# Patient Record
Sex: MALE | Race: WHITE | NOT HISPANIC OR LATINO | Employment: OTHER | URBAN - NONMETROPOLITAN AREA
[De-identification: names, ages, dates, MRNs, and addresses within clinical notes are randomized per-mention and may not be internally consistent; named-entity substitution may affect disease eponyms.]

---

## 2021-01-25 ENCOUNTER — IMMUNIZATION (OUTPATIENT)
Dept: VACCINE CLINIC | Facility: HOSPITAL | Age: 71
End: 2021-01-25

## 2021-01-25 PROCEDURE — 91300 HC SARSCOV02 VAC 30MCG/0.3ML IM: CPT | Performed by: FAMILY MEDICINE

## 2021-01-25 PROCEDURE — 0001A: CPT | Performed by: FAMILY MEDICINE

## 2021-02-16 ENCOUNTER — IMMUNIZATION (OUTPATIENT)
Dept: VACCINE CLINIC | Facility: HOSPITAL | Age: 71
End: 2021-02-16

## 2021-02-16 PROCEDURE — 0002A: CPT | Performed by: INTERNAL MEDICINE

## 2021-02-16 PROCEDURE — 91300 HC SARSCOV02 VAC 30MCG/0.3ML IM: CPT | Performed by: INTERNAL MEDICINE

## 2021-04-27 ENCOUNTER — HOSPITAL ENCOUNTER (OUTPATIENT)
Dept: GENERAL RADIOLOGY | Facility: HOSPITAL | Age: 71
Discharge: HOME | End: 2021-04-27
Admitting: INTERNAL MEDICINE

## 2021-04-27 ENCOUNTER — TRANSCRIBE ORDERS (OUTPATIENT)
Dept: ADMINISTRATIVE | Facility: HOSPITAL | Age: 71
End: 2021-04-27

## 2021-04-27 DIAGNOSIS — M25.511 RIGHT SHOULDER PAIN, UNSPECIFIED CHRONICITY: Primary | ICD-10-CM

## 2021-04-27 DIAGNOSIS — M25.511 RIGHT SHOULDER PAIN, UNSPECIFIED CHRONICITY: ICD-10-CM

## 2021-04-27 PROCEDURE — 73030 X-RAY EXAM OF SHOULDER: CPT | Performed by: RADIOLOGY

## 2021-04-27 PROCEDURE — 73030 X-RAY EXAM OF SHOULDER: CPT

## 2021-10-22 ENCOUNTER — TRANSCRIBE ORDERS (OUTPATIENT)
Dept: ADMINISTRATIVE | Facility: HOSPITAL | Age: 71
End: 2021-10-22

## 2021-10-22 DIAGNOSIS — I45.9 CONDUCTION DISORDER, UNSPECIFIED: Primary | ICD-10-CM

## 2021-11-10 ENCOUNTER — HOSPITAL ENCOUNTER (OUTPATIENT)
Dept: CARDIOLOGY | Facility: HOSPITAL | Age: 71
Discharge: HOME | End: 2021-11-10
Admitting: INTERNAL MEDICINE

## 2021-11-10 DIAGNOSIS — I45.9 CONDUCTION DISORDER, UNSPECIFIED: ICD-10-CM

## 2021-11-10 PROCEDURE — 93306 TTE W/DOPPLER COMPLETE: CPT | Performed by: INTERNAL MEDICINE

## 2021-11-10 PROCEDURE — 93306 TTE W/DOPPLER COMPLETE: CPT

## 2021-11-11 LAB
BH CV ECHO MEAS - % IVS THICK: 4.7 %
BH CV ECHO MEAS - % LVPW THICK: 28.2 %
BH CV ECHO MEAS - ACS: 2.2 CM
BH CV ECHO MEAS - AO MAX PG: 7 MMHG
BH CV ECHO MEAS - AO MEAN PG: 2 MMHG
BH CV ECHO MEAS - AO ROOT AREA (BSA CORRECTED): 1.4
BH CV ECHO MEAS - AO ROOT AREA: 6.8 CM^2
BH CV ECHO MEAS - AO ROOT DIAM: 3 CM
BH CV ECHO MEAS - AO V2 MAX: 132 CM/SEC
BH CV ECHO MEAS - AO V2 MEAN: 69.7 CM/SEC
BH CV ECHO MEAS - AO V2 VTI: 18.5 CM
BH CV ECHO MEAS - BSA(HAYCOCK): 2.2 M^2
BH CV ECHO MEAS - BSA: 2.1 M^2
BH CV ECHO MEAS - BZI_BMI: 34.5 KILOGRAMS/M^2
BH CV ECHO MEAS - BZI_METRIC_HEIGHT: 170.2 CM
BH CV ECHO MEAS - BZI_METRIC_WEIGHT: 99.8 KG
BH CV ECHO MEAS - EDV(CUBED): 83.7 ML
BH CV ECHO MEAS - EDV(MOD-SP4): 67.7 ML
BH CV ECHO MEAS - EDV(TEICH): 86.5 ML
BH CV ECHO MEAS - EF(CUBED): 59.6 %
BH CV ECHO MEAS - EF(MOD-SP4): 57.2 %
BH CV ECHO MEAS - EF(TEICH): 51.4 %
BH CV ECHO MEAS - ESV(CUBED): 33.9 ML
BH CV ECHO MEAS - ESV(MOD-SP4): 29 ML
BH CV ECHO MEAS - ESV(TEICH): 42.1 ML
BH CV ECHO MEAS - FS: 26.1 %
BH CV ECHO MEAS - IVS/LVPW: 0.93
BH CV ECHO MEAS - IVSD: 1.2 CM
BH CV ECHO MEAS - IVSS: 1.2 CM
BH CV ECHO MEAS - LA DIMENSION: 3.3 CM
BH CV ECHO MEAS - LA/AO: 1.1
BH CV ECHO MEAS - LV DIASTOLIC VOL/BSA (35-75): 32.2 ML/M^2
BH CV ECHO MEAS - LV MASS(C)D: 192.1 GRAMS
BH CV ECHO MEAS - LV MASS(C)DI: 91.2 GRAMS/M^2
BH CV ECHO MEAS - LV MASS(C)S: 158 GRAMS
BH CV ECHO MEAS - LV MASS(C)SI: 75.1 GRAMS/M^2
BH CV ECHO MEAS - LV SYSTOLIC VOL/BSA (12-30): 13.8 ML/M^2
BH CV ECHO MEAS - LVIDD: 4.4 CM
BH CV ECHO MEAS - LVIDS: 3.2 CM
BH CV ECHO MEAS - LVLD AP4: 6.7 CM
BH CV ECHO MEAS - LVLS AP4: 5.7 CM
BH CV ECHO MEAS - LVOT AREA (M): 3.1 CM^2
BH CV ECHO MEAS - LVOT AREA: 3.1 CM^2
BH CV ECHO MEAS - LVOT DIAM: 2 CM
BH CV ECHO MEAS - LVPWD: 1.3 CM
BH CV ECHO MEAS - LVPWS: 1.6 CM
BH CV ECHO MEAS - MV A MAX VEL: 84.5 CM/SEC
BH CV ECHO MEAS - MV E MAX VEL: 56.9 CM/SEC
BH CV ECHO MEAS - MV E/A: 0.67
BH CV ECHO MEAS - PA ACC TIME: 0.03 SEC
BH CV ECHO MEAS - PA PR(ACCEL): 64.6 MMHG
BH CV ECHO MEAS - RAP SYSTOLE: 10 MMHG
BH CV ECHO MEAS - RVSP: 44.1 MMHG
BH CV ECHO MEAS - SI(AO): 60.1 ML/M^2
BH CV ECHO MEAS - SI(CUBED): 23.7 ML/M^2
BH CV ECHO MEAS - SI(MOD-SP4): 18.4 ML/M^2
BH CV ECHO MEAS - SI(TEICH): 21.1 ML/M^2
BH CV ECHO MEAS - SV(AO): 126.4 ML
BH CV ECHO MEAS - SV(CUBED): 49.9 ML
BH CV ECHO MEAS - SV(MOD-SP4): 38.7 ML
BH CV ECHO MEAS - SV(TEICH): 44.5 ML
BH CV ECHO MEAS - TR MAX VEL: 292 CM/SEC
MAXIMAL PREDICTED HEART RATE: 149 BPM
STRESS TARGET HR: 127 BPM

## 2021-11-23 PROCEDURE — 93246 EXT ECG>7D<15D RECORDING: CPT | Performed by: INTERNAL MEDICINE

## 2021-12-14 ENCOUNTER — TREATMENT (OUTPATIENT)
Dept: CARDIOLOGY | Facility: CLINIC | Age: 71
End: 2021-12-14

## 2021-12-14 DIAGNOSIS — I45.9 CONDUCTION DISORDER, UNSPECIFIED: ICD-10-CM

## 2021-12-14 PROCEDURE — 93248 EXT ECG>7D<15D REV&INTERPJ: CPT | Performed by: INTERNAL MEDICINE

## 2021-12-15 ENCOUNTER — TELEPHONE (OUTPATIENT)
Dept: CARDIOLOGY | Facility: CLINIC | Age: 71
End: 2021-12-15

## 2021-12-15 NOTE — TELEPHONE ENCOUNTER
Contacted patient regarding test results. Patient verbalized understanding. He also stated that he has an appt with Dr. Fernando in Jan but will call their office to see if he needs to be seen earlier.

## 2021-12-15 NOTE — TELEPHONE ENCOUNTER
----- Message from Birdie Fernandes MD sent at 12/15/2021 11:34 AM EST -----  Send the Holter monitor report to Dr. Fernando.  All copies.Tell the patient that his monitor shows runs of atrial fibrillation and short runs of V. tach.  he needs to see Dr. Fernando.  We had send the report to her   ----- Message -----  From: Jt Givens MA  Sent: 12/14/2021   4:28 PM EST  To: Birdie Fernandes MD    Please review EOS holter monitor report.    Thanks,    Jt

## 2021-12-20 ENCOUNTER — OFFICE VISIT (OUTPATIENT)
Dept: CARDIOLOGY | Facility: CLINIC | Age: 71
End: 2021-12-20

## 2021-12-20 VITALS
DIASTOLIC BLOOD PRESSURE: 78 MMHG | SYSTOLIC BLOOD PRESSURE: 110 MMHG | HEART RATE: 76 BPM | HEIGHT: 67 IN | OXYGEN SATURATION: 98 % | BODY MASS INDEX: 32.33 KG/M2 | WEIGHT: 206 LBS | TEMPERATURE: 97.9 F

## 2021-12-20 DIAGNOSIS — E03.9 HYPOTHYROIDISM (ACQUIRED): ICD-10-CM

## 2021-12-20 DIAGNOSIS — R07.9 CHEST PAIN IN ADULT: ICD-10-CM

## 2021-12-20 DIAGNOSIS — E78.2 MIXED HYPERLIPIDEMIA: ICD-10-CM

## 2021-12-20 DIAGNOSIS — Z79.01 CHRONIC ANTICOAGULATION: ICD-10-CM

## 2021-12-20 DIAGNOSIS — I47.29 VENTRICULAR TACHYCARDIA, NON-SUSTAINED (HCC): ICD-10-CM

## 2021-12-20 DIAGNOSIS — I10 ESSENTIAL HYPERTENSION: ICD-10-CM

## 2021-12-20 DIAGNOSIS — I48.0 PAROXYSMAL ATRIAL FIBRILLATION (HCC): ICD-10-CM

## 2021-12-20 DIAGNOSIS — I48.0 PAROXYSMAL ATRIAL FIBRILLATION (HCC): Primary | ICD-10-CM

## 2021-12-20 PROCEDURE — 93000 ELECTROCARDIOGRAM COMPLETE: CPT | Performed by: INTERNAL MEDICINE

## 2021-12-20 PROCEDURE — 99204 OFFICE O/P NEW MOD 45 MIN: CPT | Performed by: INTERNAL MEDICINE

## 2021-12-20 RX ORDER — METOPROLOL SUCCINATE 100 MG/1
100 TABLET, EXTENDED RELEASE ORAL EVERY MORNING
COMMUNITY
Start: 2021-12-13

## 2021-12-20 RX ORDER — ATORVASTATIN CALCIUM 20 MG/1
20 TABLET, FILM COATED ORAL NIGHTLY
COMMUNITY
Start: 2021-12-13

## 2021-12-20 RX ORDER — LOSARTAN POTASSIUM AND HYDROCHLOROTHIAZIDE 12.5; 1 MG/1; MG/1
TABLET ORAL DAILY
COMMUNITY
Start: 2021-09-25

## 2021-12-20 RX ORDER — PANTOPRAZOLE SODIUM 40 MG/1
40 TABLET, DELAYED RELEASE ORAL DAILY
COMMUNITY
Start: 2021-10-22

## 2021-12-20 RX ORDER — APIXABAN 5 MG/1
5 TABLET, FILM COATED ORAL EVERY 12 HOURS
COMMUNITY
Start: 2021-12-16 | End: 2023-02-03 | Stop reason: SDUPTHER

## 2021-12-20 RX ORDER — LEVOTHYROXINE SODIUM 0.07 MG/1
75 TABLET ORAL DAILY
COMMUNITY
Start: 2021-11-22

## 2021-12-20 RX ORDER — ZOLPIDEM TARTRATE 10 MG/1
10 TABLET ORAL NIGHTLY
COMMUNITY
Start: 2021-10-20

## 2021-12-20 RX ORDER — METFORMIN HYDROCHLORIDE 500 MG/1
500 TABLET, EXTENDED RELEASE ORAL 2 TIMES DAILY
COMMUNITY
Start: 2021-11-22

## 2021-12-20 RX ORDER — AZELASTINE 1 MG/ML
SPRAY, METERED NASAL
COMMUNITY
Start: 2021-11-22

## 2021-12-20 RX ORDER — METHOTREXATE 2.5 MG/1
TABLET ORAL
COMMUNITY
Start: 2021-11-30 | End: 2023-02-03

## 2021-12-20 RX ORDER — UBIDECARENONE 100 MG
200 CAPSULE ORAL DAILY
COMMUNITY

## 2021-12-20 RX ORDER — PREDNISONE 10 MG/1
TABLET ORAL
COMMUNITY
Start: 2021-12-04 | End: 2023-02-03

## 2021-12-20 RX ORDER — MULTIPLE VITAMINS W/ MINERALS TAB 9MG-400MCG
1 TAB ORAL DAILY
COMMUNITY

## 2021-12-20 NOTE — PROGRESS NOTES
Subjective   Chief Complaint   Patient presents with   • Establish Care     Abnormal holter monitor   • Atrial Fibrillation     pt was started on Eliquis past friday 12-17-21       History of Present Illness    Patient is 71 years old white male who is here for cardiac evaluation because of abnormal Holter.. patient states that he has been having some fluttering feeling in the chest and underwent a Holter which showed runs of atrial fibrillation and also short runs of wide QRS tachycardia.  Patient denies any syncope or presyncope.  No dizziness.  He states that he was diagnosed as having neurocardiogenic syncope long time ago.  No records are available patient had a tilt table testing at [facility] in [place].  Patient passed out and was told that he has neurocardiogenic syncope.  No further details are available it is not clear if patient was positive for POTS.  He has been taking Zoloft and metoprolol for that for at least 15 years or longer.      During cardiac work-up he also had coronary angiography at  around 15 to 20 years ago which was normal.    He has multiple risk factors for coronary artery disease including  Male gender  Age  Hypertension  Hyperlipidemia  Diabetes mellitus.    Patient admits to having some chest discomfort but no true chest pain and is not particularly related to exertion.  He primarily has awareness of palpitations and fluttering feeling in the chest.    The problem is rheumatoid arthritis on methotrexate and Deltasone.  He also has hyperthyroidism on Synthroid 75 mcg daily.  Past Surgical History:   Procedure Laterality Date   • CARDIAC ABLATION      CATHETER ATRIAL SUPRAVENTRICULAR TACHYCARDIA   • CARPAL TUNNEL RELEASE     • COLECTOMY PARTIAL / TOTAL     • HERNIA REPAIR     • KNEE ARTHROSCOPY      BOTH KNEES     Family History   Problem Relation Age of Onset   • Cancer Mother    • Diabetes Father    • Heart disease Father    • Diabetes Other    • Heart disease Other       Past Medical History:   Diagnosis Date   • Atrial fibrillation (HCC)    • Bowel trouble    • Diabetes mellitus (HCC)    • Hyperlipidemia    • Hypertension    • Hypothyroid    • Rheumatoid aortitis        Patient Active Problem List   Diagnosis   • Paroxysmal atrial fibrillation (HCC)   • Chronic anticoagulation,eliquis   • Mixed hyperlipidemia   • Hypothyroidism (acquired)   • Essential hypertension   • Chest pain in adult   • Ventricular tachycardia, non-sustained (HCC)         Social History     Tobacco Use   • Smoking status: Former Smoker     Packs/day: 1.00     Years: 2.00     Pack years: 2.00     Types: Cigarettes   • Smokeless tobacco: Never Used   Substance Use Topics   • Alcohol use: Yes     Comment: occasional   • Drug use: Never         The following portions of the patient's history were reviewed and updated as appropriate: allergies, current medications, past family history, past medical history, past social history, past surgical history and problem list.    No Known Allergies      Current Outpatient Medications:   •  aspirin 81 MG tablet, Take  by mouth., Disp: , Rfl:   •  atorvastatin (LIPITOR) 20 MG tablet, Take 20 mg by mouth Every Night., Disp: , Rfl:   •  azelastine (ASTELIN) 0.1 % nasal spray, , Disp: , Rfl:   •  coenzyme Q10 100 MG capsule, Take 100 mg by mouth 2 (Two) Times a Day., Disp: , Rfl:   •  Eliquis 5 MG tablet tablet, Take 5 mg by mouth Every 12 (Twelve) Hours., Disp: , Rfl:   •  levothyroxine (SYNTHROID, LEVOTHROID) 75 MCG tablet, Take 75 mcg by mouth Daily., Disp: , Rfl:   •  losartan-hydrochlorothiazide (HYZAAR) 100-12.5 MG per tablet, Take  by mouth Daily., Disp: , Rfl:   •  metFORMIN ER (GLUCOPHAGE-XR) 500 MG 24 hr tablet, Take 500 mg by mouth 2 (Two) Times a Day., Disp: , Rfl:   •  methotrexate 2.5 MG tablet, , Disp: , Rfl:   •  metoprolol succinate XL (TOPROL-XL) 50 MG 24 hr tablet, Take 50 mg by mouth Every Morning., Disp: , Rfl:   •  multivitamin with minerals tablet  "tablet, Take 1 tablet by mouth Daily., Disp: , Rfl:   •  Omega-3 Fatty Acids (FISH OIL PO), , Disp: , Rfl:   •  pantoprazole (PROTONIX) 40 MG EC tablet, Take 40 mg by mouth Daily., Disp: , Rfl:   •  predniSONE (DELTASONE) 10 MG tablet, , Disp: , Rfl:   •  sertraline (ZOLOFT) 100 MG tablet, Take  by mouth., Disp: , Rfl:   •  zolpidem (AMBIEN) 10 MG tablet, Take 10 mg by mouth Every Night., Disp: , Rfl:     Review of Systems   Constitutional: Negative.   HENT: Negative.  Negative for congestion.    Eyes: Negative.    Cardiovascular: Positive for palpitations. Negative for chest pain, cyanosis, dyspnea on exertion, irregular heartbeat, leg swelling, near-syncope, orthopnea, paroxysmal nocturnal dyspnea and syncope.   Respiratory: Negative.  Negative for shortness of breath.    Hematologic/Lymphatic: Negative.    Musculoskeletal: Negative.    Gastrointestinal: Negative.    Neurological: Negative.  Negative for headaches.        Objective      /78 (BP Location: Left arm, Patient Position: Sitting, Cuff Size: Adult)   Pulse 76   Temp 97.9 °F (36.6 °C)   Ht 170.2 cm (67\")   Wt 93.4 kg (206 lb)   SpO2 98%   BMI 32.26 kg/m²     Neck:      Thyroid: Thyroid normal.      Vascular: No JVR. JVD normal.   Pulmonary:      Effort: Pulmonary effort is normal.      Breath sounds: Normal breath sounds. No stridor. No wheezing. No rhonchi. No rales.   Cardiovascular:      PMI at left midclavicular line. Normal rate. Regular rhythm. Normal S1. Normal S2.      Murmurs: There is no murmur.      No gallop. No click. No rub.   Pulses:     Intact distal pulses.   Edema:     Peripheral edema absent.   Musculoskeletal:      Cervical back: Normal range of motion. Skin:     General: Skin is warm and dry.   Neurological:      Mental Status: Alert and oriented to person, place and time.      Motor: Motor function is intact.      Gait: Gait is intact.         Lab Review:  Copy of lab work requested from Dr. Fernando.  Tilt table testing " report requested from [facility] in [place].    Coronary angiography report from  requested.      ECG 12 Lead    Date/Time: 12/20/2021 12:41 PM  Performed by: Birdie Fernandes MD  Authorized by: Birdie Fernandes MD   Comparison: not compared with previous ECG   Previous ECG: no previous ECG available  Rhythm: sinus rhythm  Ectopy: atrial premature contractions  Rate: normal  BPM: 76  Conduction: conduction normal  ST Segments: ST segments normal  T Waves: T waves normal  QRS axis: normal    Clinical impression: abnormal EKG                       I reviewed the patient's new clinical results.  I personally viewed and interpreted the patient's EKG/lab data        Assessment:   Diagnosis Plan   1. Paroxysmal atrial fibrillation (HCC)  ECG 12 Lead   2. Chronic anticoagulation,eliquis     3. Mixed hyperlipidemia     4. Hypothyroidism (acquired)     5. Essential hypertension     6. Chest pain in adult  Stress Test With Myocardial Perfusion One Day   7. Ventricular tachycardia, non-sustained (HCC)            Plan:  Patient is 71 years old white male who was evaluated because of palpitation.  He was found to have paroxysmal atrial fibrillation on EKG today shows sinus rhythm with occasional PACs.  Patient was advised to increase Toprol-XL 75 mg daily.  He will continue Eliquis 5 mg twice daily.  Bleeding side effects were discussed in detail.    Patient also has short runs of V. tach's.  He is asymptomatic except feeling of fluttering.  No syncope or presyncope.    His echocardiogram was also reviewed LV functions are normal.  A treadmill stress test with myocardial perfusion scan was scheduled because of chest discomfort and to assess the arrhythmia.    [facility] records from [facility], [facility] and Dr. Fernando were requested  Patient will be reevaluated after all the records are available and after stress test.    Thank you for giving me the oppertunity to participate in your patient's  cardiac care.    Sincerely,    GEORGIA Fernandes M.D. FACP FAC     No follow-ups on file.

## 2022-01-10 ENCOUNTER — APPOINTMENT (OUTPATIENT)
Dept: NUCLEAR MEDICINE | Facility: HOSPITAL | Age: 72
End: 2022-01-10

## 2022-01-10 ENCOUNTER — APPOINTMENT (OUTPATIENT)
Dept: CARDIOLOGY | Facility: HOSPITAL | Age: 72
End: 2022-01-10

## 2022-02-02 ENCOUNTER — HOSPITAL ENCOUNTER (OUTPATIENT)
Dept: NUCLEAR MEDICINE | Facility: HOSPITAL | Age: 72
Discharge: HOME | End: 2022-02-02

## 2022-02-02 ENCOUNTER — HOSPITAL ENCOUNTER (OUTPATIENT)
Dept: CARDIOLOGY | Facility: HOSPITAL | Age: 72
Discharge: HOME | End: 2022-02-02

## 2022-02-02 DIAGNOSIS — R07.9 CHEST PAIN IN ADULT: ICD-10-CM

## 2022-02-02 LAB
BH CV NUCLEAR PRIOR STUDY: 3
BH CV REST NUCLEAR ISOTOPE DOSE: 10.5 MCI
BH CV STRESS BP STAGE 1: NORMAL
BH CV STRESS BP STAGE 2: NORMAL
BH CV STRESS COMMENTS STAGE 1: NORMAL
BH CV STRESS COMMENTS STAGE 2: NORMAL
BH CV STRESS DOSE REGADENOSON STAGE 1: 0.4
BH CV STRESS DURATION MIN STAGE 1: 0
BH CV STRESS DURATION MIN STAGE 2: 4
BH CV STRESS DURATION SEC STAGE 1: 10
BH CV STRESS DURATION SEC STAGE 2: 0
BH CV STRESS HR STAGE 1: 91
BH CV STRESS HR STAGE 2: 104
BH CV STRESS NUCLEAR ISOTOPE DOSE: 30.8 MCI
BH CV STRESS PROTOCOL 1: NORMAL
BH CV STRESS RECOVERY BP: NORMAL MMHG
BH CV STRESS RECOVERY HR: 85 BPM
BH CV STRESS STAGE 1: 1
BH CV STRESS STAGE 2: 2
LV EF NUC BP: 62 %
MAXIMAL PREDICTED HEART RATE: 149 BPM
PERCENT MAX PREDICTED HR: 69.8 %
STRESS BASELINE BP: NORMAL MMHG
STRESS BASELINE HR: 69 BPM
STRESS PERCENT HR: 82 %
STRESS POST PEAK BP: NORMAL MMHG
STRESS POST PEAK HR: 104 BPM
STRESS TARGET HR: 127 BPM

## 2022-02-02 PROCEDURE — 25010000002 REGADENOSON 0.4 MG/5ML SOLUTION: Performed by: INTERNAL MEDICINE

## 2022-02-02 PROCEDURE — 78452 HT MUSCLE IMAGE SPECT MULT: CPT

## 2022-02-02 PROCEDURE — 0 TECHNETIUM SESTAMIBI: Performed by: INTERNAL MEDICINE

## 2022-02-02 PROCEDURE — 78452 HT MUSCLE IMAGE SPECT MULT: CPT | Performed by: INTERNAL MEDICINE

## 2022-02-02 PROCEDURE — A9500 TC99M SESTAMIBI: HCPCS | Performed by: INTERNAL MEDICINE

## 2022-02-02 PROCEDURE — 93017 CV STRESS TEST TRACING ONLY: CPT

## 2022-02-02 PROCEDURE — 93018 CV STRESS TEST I&R ONLY: CPT | Performed by: INTERNAL MEDICINE

## 2022-02-02 RX ADMIN — TECHNETIUM TC 99M SESTAMIBI 1 DOSE: 1 INJECTION INTRAVENOUS at 08:25

## 2022-02-02 RX ADMIN — TECHNETIUM TC 99M SESTAMIBI 1 DOSE: 1 INJECTION INTRAVENOUS at 10:18

## 2022-02-02 RX ADMIN — REGADENOSON 0.4 MG: 0.08 INJECTION, SOLUTION INTRAVENOUS at 10:18

## 2022-02-03 ENCOUNTER — OFFICE VISIT (OUTPATIENT)
Dept: CARDIOLOGY | Facility: CLINIC | Age: 72
End: 2022-02-03

## 2022-02-03 VITALS
SYSTOLIC BLOOD PRESSURE: 132 MMHG | TEMPERATURE: 96.8 F | BODY MASS INDEX: 33.12 KG/M2 | WEIGHT: 211 LBS | OXYGEN SATURATION: 97 % | RESPIRATION RATE: 18 BRPM | DIASTOLIC BLOOD PRESSURE: 74 MMHG | HEART RATE: 75 BPM | HEIGHT: 67 IN

## 2022-02-03 DIAGNOSIS — I47.29 VENTRICULAR TACHYCARDIA, NON-SUSTAINED: ICD-10-CM

## 2022-02-03 DIAGNOSIS — I48.0 PAROXYSMAL ATRIAL FIBRILLATION: Primary | ICD-10-CM

## 2022-02-03 DIAGNOSIS — Z79.01 CHRONIC ANTICOAGULATION: ICD-10-CM

## 2022-02-03 DIAGNOSIS — I10 ESSENTIAL HYPERTENSION: ICD-10-CM

## 2022-02-03 DIAGNOSIS — E78.2 MIXED HYPERLIPIDEMIA: ICD-10-CM

## 2022-02-03 PROCEDURE — 99214 OFFICE O/P EST MOD 30 MIN: CPT | Performed by: INTERNAL MEDICINE

## 2022-02-04 NOTE — PROGRESS NOTES
subjective     Chief Complaint   Patient presents with   • Atrial Fibrillation     follow up     History of Present Illness    Patient is 71 years old white male who is here for cardiology follow-up.  He was initially seen because of palpitations Holter monitor showed runs of atrial fibrillation and also short runs of wide QRS tachycardia.  He also has history of neurocardiogenic syncope with positive tilt table testing 8 years ago reports not available    Patient has multiple risk factors for coronary artery disease and underwent cardiac work-up    Patient is doing very well and is asymptomatic and is here for follow-up and to discuss the results.    Patient denies any syncope or presyncope.  No chest pain or shortness of breath.  He is taking his medications regularly.    Past Surgical History:   Procedure Laterality Date   • CARDIAC ABLATION      CATHETER ATRIAL SUPRAVENTRICULAR TACHYCARDIA   • CARPAL TUNNEL RELEASE     • COLECTOMY PARTIAL / TOTAL     • HERNIA REPAIR     • KNEE ARTHROSCOPY      BOTH KNEES     Family History   Problem Relation Age of Onset   • Cancer Mother    • Diabetes Father    • Heart disease Father    • Diabetes Other    • Heart disease Other      Past Medical History:   Diagnosis Date   • Atrial fibrillation (HCC)    • Bowel trouble    • Diabetes mellitus (HCC)    • Hyperlipidemia    • Hypertension    • Hypothyroid    • Rheumatoid aortitis      Patient Active Problem List   Diagnosis   • Paroxysmal atrial fibrillation (HCC)   • Chronic anticoagulation,eliquis   • Mixed hyperlipidemia   • Hypothyroidism (acquired)   • Essential hypertension   • Chest pain in adult   • Ventricular tachycardia, non-sustained (HCC)       Social History     Tobacco Use   • Smoking status: Former Smoker     Packs/day: 1.00     Years: 2.00     Pack years: 2.00     Types: Cigarettes   • Smokeless tobacco: Never Used   Substance Use Topics   • Alcohol use: Yes     Comment: occasional   • Drug use: Never       No  Known Allergies    Current Outpatient Medications on File Prior to Visit   Medication Sig   • aspirin 81 MG tablet Take  by mouth.   • atorvastatin (LIPITOR) 20 MG tablet Take 20 mg by mouth Every Night.   • azelastine (ASTELIN) 0.1 % nasal spray    • coenzyme Q10 100 MG capsule Take 200 mg by mouth Daily.   • Eliquis 5 MG tablet tablet Take 5 mg by mouth Every 12 (Twelve) Hours.   • levothyroxine (SYNTHROID, LEVOTHROID) 75 MCG tablet Take 75 mcg by mouth Daily.   • losartan-hydrochlorothiazide (HYZAAR) 100-12.5 MG per tablet Take  by mouth Daily.   • metFORMIN ER (GLUCOPHAGE-XR) 500 MG 24 hr tablet Take 500 mg by mouth 2 (Two) Times a Day.   • methotrexate 2.5 MG tablet    • metoprolol succinate XL (TOPROL-XL) 50 MG 24 hr tablet Take 50 mg by mouth Every Morning.   • multivitamin with minerals tablet tablet Take 1 tablet by mouth Daily.   • Omega-3 Fatty Acids (FISH OIL PO)    • pantoprazole (PROTONIX) 40 MG EC tablet Take 40 mg by mouth Daily.   • predniSONE (DELTASONE) 10 MG tablet    • sertraline (ZOLOFT) 100 MG tablet Take  by mouth.   • zolpidem (AMBIEN) 10 MG tablet Take 10 mg by mouth Every Night.     No current facility-administered medications on file prior to visit.         The following portions of the patient's history were reviewed and updated as appropriate: allergies, current medications, past family history, past medical history, past social history, past surgical history and problem list.    Review of Systems   Constitutional: Negative.   HENT: Negative.  Negative for congestion.    Eyes: Negative.    Cardiovascular: Negative.  Negative for chest pain, cyanosis, dyspnea on exertion, irregular heartbeat, leg swelling, near-syncope, orthopnea, palpitations, paroxysmal nocturnal dyspnea and syncope.   Respiratory: Negative.  Negative for shortness of breath.    Hematologic/Lymphatic: Negative.    Musculoskeletal: Negative.    Gastrointestinal: Negative.    Neurological: Negative.  Negative for  "headaches.          Objective:     /74 (BP Location: Right arm, Patient Position: Sitting, Cuff Size: Adult)   Pulse 75   Temp 96.8 °F (36 °C)   Resp 18   Ht 170.2 cm (67\")   Wt 95.7 kg (211 lb)   SpO2 97%   BMI 33.05 kg/m²   Pulmonary:      Effort: Pulmonary effort is normal.      Breath sounds: Normal breath sounds. No stridor. No wheezing. No rhonchi. No rales.   Cardiovascular:      PMI at left midclavicular line. Normal rate. Regular rhythm. Normal S1. Normal S2.      Murmurs: There is no murmur.      No gallop. No click. No rub.   Pulses:     Intact distal pulses.   Edema:     Peripheral edema absent.           Lab Review  Lab Results   Component Value Date     04/20/2015    K 4.1 04/20/2015     04/20/2015    BUN 20 04/20/2015    CREATININE 0.88 04/20/2015    GLUCOSE 112 (H) 04/20/2015    CALCIUM 9.4 04/20/2015    ALT 54 (H) 04/15/2015    ALKPHOS 48 04/15/2015    LABIL2 1.4 (L) 04/15/2015     No results found for: CKTOTAL  Lab Results   Component Value Date    WBC 8.9 04/17/2015    HGB 10.6 (L) 04/17/2015    HCT 31.9 (L) 04/17/2015     04/17/2015     Lab Results   Component Value Date    INR 1.99 04/27/2015    INR 1.98 04/23/2015    INR 1.98 04/20/2015       Procedures     Interpretation Summary  Stress test  February 2, 2022    · A pharmacological stress test was performed using regadenoson without low-level exercise.  · Resting EKG showed regular sinus rhythm rate of 67 bpm, normal EKG.  · Patient tolerated lexiscan well without complications, no aminophylline was administered  · ST segments did not show any diagnostic changes, no significant arrhythmia detected.  · Isotope uptake appears to be homogeneous throughout the myocardium both on exercise and delayed images.  · Myocardial perfusion imaging indicates a normal myocardial perfusion study with no evidence of ischemia.  · Left ventricular ejection fraction is normal. (Calculated EF = 62%).  · Impressions are consistent " with a low risk study.  · There is no prior study available for comparison.    Interpretation Summary  Echocardiogram 11/10/2021    · Normal left ventricular cavity size and wall thickness noted.  · Left ventricular ejection fraction appears to be 61 - 65%. Left ventricular systolic function is normal.  · Left ventricular diastolic function is consistent with (grade I) impaired relaxation.  · The aortic valve is structurally normal with no regurgitation or stenosis present. The aortic valve appears trileaflet.  · The mitral valve is structurally normal with no regurgitation or significant stenosis present  · Mild tricuspid valve regurgitation is present. Mild pulmonary hypertension is present.  · There is mild pulmonic valve regurgitation present.  · The pericardium is normal. There is no evidence of pericardial effusion.  · Primary rhythm was sinus but pt developed transient arrhythmia during the study which needs further evaluation. Two week long holter monitor is recommanded.      I personally viewed and interpreted the patient's LAB data         Assessment:     1. Paroxysmal atrial fibrillation (HCC)    2. Ventricular tachycardia, non-sustained (HCC)    3. Essential hypertension    4. Mixed hyperlipidemia    5. Chronic anticoagulation,eliquis          Plan:     Patient is 71 years old white male with history of paroxysmal atrial fibrillation is currently in sinus rhythm and is anticoagulated with Eliquis.  Holter monitor showed short runs of A. fib and 4 beat run of nonsustained V. tach.  He has been taking Toprol-XL 50 mg daily and is doing very well.  There has been no syncope or presyncope.  Patient was advised to continue Toprol.  Blood pressure is very well controlled he will continue losartan HCT.  He also is taking Lipitor for hyperlipidemia.  He was advised to follow closely with Dr. Fernando he has multiple significant risk factors for coronary artery disease.    Patient will be reevaluated in 1 year and  sooner if there are any symptoms.  Follow-up scheduled        No follow-ups on file.

## 2023-02-03 ENCOUNTER — OFFICE VISIT (OUTPATIENT)
Dept: CARDIOLOGY | Facility: CLINIC | Age: 73
End: 2023-02-03
Payer: MEDICARE

## 2023-02-03 VITALS
HEIGHT: 67 IN | WEIGHT: 225 LBS | BODY MASS INDEX: 35.31 KG/M2 | HEART RATE: 67 BPM | OXYGEN SATURATION: 98 % | SYSTOLIC BLOOD PRESSURE: 128 MMHG | DIASTOLIC BLOOD PRESSURE: 64 MMHG

## 2023-02-03 DIAGNOSIS — I10 ESSENTIAL HYPERTENSION: ICD-10-CM

## 2023-02-03 DIAGNOSIS — E78.2 MIXED HYPERLIPIDEMIA: ICD-10-CM

## 2023-02-03 DIAGNOSIS — I47.29 VENTRICULAR TACHYCARDIA, NON-SUSTAINED: ICD-10-CM

## 2023-02-03 DIAGNOSIS — Z79.01 CHRONIC ANTICOAGULATION: ICD-10-CM

## 2023-02-03 DIAGNOSIS — I48.0 PAROXYSMAL ATRIAL FIBRILLATION: Primary | ICD-10-CM

## 2023-02-03 PROCEDURE — 93000 ELECTROCARDIOGRAM COMPLETE: CPT | Performed by: NURSE PRACTITIONER

## 2023-02-03 PROCEDURE — 99214 OFFICE O/P EST MOD 30 MIN: CPT | Performed by: NURSE PRACTITIONER

## 2023-02-03 RX ORDER — APIXABAN 5 MG/1
5 TABLET, FILM COATED ORAL EVERY 12 HOURS
Qty: 28 TABLET | Refills: 0 | COMMUNITY
Start: 2023-02-03

## 2023-02-03 RX ORDER — UPADACITINIB 15 MG/1
15 TABLET, EXTENDED RELEASE ORAL DAILY
COMMUNITY

## 2023-02-03 NOTE — PROGRESS NOTES
Subjective     Clinton Hernandez is a 72 y.o. male.   Chief Complaint   Patient presents with   • Atrial Fibrillation     Follow up     History of Present Illness   lCinton Hernandez is a 72 y.o. male who present to the clinic today for cardiology follow up. He is overall doing well and denies any acute complaint.     Paroxysmal atrial fibrillation currently on Toprol XL. He denies palpitations, dizziness or syncope. He is anticoagulated on Eliquis 5 mg twice daily for [id number] score of at least 2 for hypertension, age and gender.  He denies any bleeding issues.  History of nonsustained V. tach currently asymptomatic and heart rate controlled on Toprol    Hypertension well-controlled on losartan/HCTZ 100/12.5 mg daily and Toprol 100 mg daily.  He denies chest pains or dyspnea. He reports compliance with medication.  He tries to monitor his sodium    Hyperlipidemia currently on Lipitor 20 mg nightly and co-Q10.  He denies medication side effects and reports compliance with medication.    History of neurocardiogenic syncope and unclear if he was positive for POTS however remains stable and asymptomatic on Zoloft and metoprolol for greater than 15 years.    Patient Active Problem List   Diagnosis   • Paroxysmal atrial fibrillation (HCC)   • Chronic anticoagulation,eliquis   • Mixed hyperlipidemia   • Hypothyroidism (acquired)   • Essential hypertension   • Chest pain in adult   • Ventricular tachycardia, non-sustained     Past Medical History:   Diagnosis Date   • Abnormal ECG 2021   • Arrhythmia 2022   • Atrial fibrillation (HCC)    • Bowel trouble    • Diabetes mellitus (HCC)    • Hyperlipidemia    • Hypertension    • Hypothyroid    • Rheumatoid aortitis      Past Surgical History:   Procedure Laterality Date   • CARDIAC ABLATION      CATHETER ATRIAL SUPRAVENTRICULAR TACHYCARDIA   • CARDIAC CATHETERIZATION  1990   • CARPAL TUNNEL RELEASE     • COLECTOMY PARTIAL / TOTAL     • HERNIA REPAIR     • KNEE ARTHROSCOPY      BOTH  KNEES       Family History   Problem Relation Age of Onset   • Cancer Mother    • Diabetes Father    • Heart disease Father    • Heart attack Father    • Diabetes Other    • Heart disease Other      Social History     Tobacco Use   • Smoking status: Former     Packs/day: 0.50     Years: 2.00     Pack years: 1.00     Types: Cigarettes     Start date: 1975     Quit date: 1978     Years since quittin.1   • Smokeless tobacco: Never   Substance Use Topics   • Alcohol use: Not Currently     Comment: occasional   • Drug use: Never         The following portions of the patient's history were reviewed and updated as appropriate: allergies, current medications, past family history, past medical history, past social history, past surgical history and problem list.    No Known Allergies      Current Outpatient Medications:   •  aspirin 81 MG tablet, Take  by mouth., Disp: , Rfl:   •  atorvastatin (LIPITOR) 20 MG tablet, Take 20 mg by mouth Every Night., Disp: , Rfl:   •  azelastine (ASTELIN) 0.1 % nasal spray, , Disp: , Rfl:   •  coenzyme Q10 100 MG capsule, Take 200 mg by mouth Daily., Disp: , Rfl:   •  levothyroxine (SYNTHROID, LEVOTHROID) 75 MCG tablet, Take 75 mcg by mouth Daily., Disp: , Rfl:   •  losartan-hydrochlorothiazide (HYZAAR) 100-12.5 MG per tablet, Take  by mouth Daily., Disp: , Rfl:   •  metFORMIN ER (GLUCOPHAGE-XR) 500 MG 24 hr tablet, Take 500 mg by mouth 2 (Two) Times a Day., Disp: , Rfl:   •  metoprolol succinate XL (TOPROL-XL) 100 MG 24 hr tablet, Take 100 mg by mouth Every Morning., Disp: , Rfl:   •  multivitamin with minerals tablet tablet, Take 1 tablet by mouth Daily., Disp: , Rfl:   •  pantoprazole (PROTONIX) 40 MG EC tablet, Take 40 mg by mouth Daily., Disp: , Rfl:   •  sertraline (ZOLOFT) 100 MG tablet, Take  by mouth., Disp: , Rfl:   •  Upadacitinib ER (Rinvoq) 15 MG tablet sustained-release 24 hour, Take 15 mg by mouth Daily. rheumatology, Disp: , Rfl:   •  zolpidem (AMBIEN) 10 MG  "tablet, Take 10 mg by mouth Every Night., Disp: , Rfl:   •  Eliquis 5 MG tablet tablet, Take 1 tablet by mouth Every 12 (Twelve) Hours., Disp: 28 tablet, Rfl: 0    Review of Systems   Constitutional: Negative for activity change, appetite change, chills, diaphoresis, fatigue and fever.   HENT: Negative for congestion, drooling, ear discharge, ear pain, mouth sores, nosebleeds, postnasal drip, rhinorrhea, sinus pressure, sneezing and sore throat.    Eyes: Negative for pain, discharge and visual disturbance.   Respiratory: Negative for cough, chest tightness, shortness of breath and wheezing.    Cardiovascular: Negative for chest pain, palpitations and leg swelling.   Gastrointestinal: Negative for abdominal pain, constipation, diarrhea, nausea and vomiting.   Endocrine: Negative for cold intolerance, heat intolerance, polydipsia, polyphagia and polyuria.   Musculoskeletal: Negative for arthralgias, myalgias and neck pain.   Skin: Negative for rash and wound.   Neurological: Negative for dizziness, syncope, speech difficulty, weakness, light-headedness and headaches.   Hematological: Negative for adenopathy. Does not bruise/bleed easily.   Psychiatric/Behavioral: Negative for confusion, dysphoric mood and sleep disturbance. The patient is not nervous/anxious.    All other systems reviewed and are negative.    /64 (BP Location: Left arm, Patient Position: Sitting, Cuff Size: Adult)   Pulse 67   Ht 170.2 cm (67\")   Wt 102 kg (225 lb)   SpO2 98%   BMI 35.24 kg/m²     Objective   No Known Allergies    Physical Exam  Vitals reviewed.   Constitutional:       Appearance: Normal appearance. He is well-developed. He is obese.   HENT:      Head: Normocephalic.   Eyes:      Conjunctiva/sclera: Conjunctivae normal.   Neck:      Vascular: No JVD.   Cardiovascular:      Rate and Rhythm: Normal rate and regular rhythm.   Pulmonary:      Effort: Pulmonary effort is normal.      Breath sounds: Normal breath sounds. "   Musculoskeletal:      Cervical back: Neck supple.      Right lower leg: No edema.      Left lower leg: No edema.   Skin:     General: Skin is warm and dry.   Neurological:      Mental Status: He is alert and oriented to person, place, and time.   Psychiatric:         Attention and Perception: Attention normal.         Mood and Affect: Mood normal.         Speech: Speech normal.         Behavior: Behavior normal. Behavior is cooperative.         Cognition and Memory: Cognition normal.           ECG 12 Lead    Date/Time: 2/13/2023 8:42 AM  Performed by: Yovana Devi APRN  Authorized by: Yovana Devi APRN   Comparison: compared with previous ECG   Similar to previous ECG  Rhythm: sinus rhythm  Rate: normal  BPM: 67    Clinical impression: normal ECG  Comments: QT/QTc - 390/406            LABS  WBC   Date Value Ref Range Status   04/17/2015 8.9 4.5 - 12.5 K/Cumm Final     RBC   Date Value Ref Range Status   04/17/2015 3.15 (L) 4.70 - 6.10 Million Final     Hemoglobin   Date Value Ref Range Status   04/17/2015 10.6 (L) 14.0 - 18.0 g/dL Final     Hematocrit   Date Value Ref Range Status   04/17/2015 31.9 (L) 42.0 - 52.0 % Final     MCV   Date Value Ref Range Status   04/17/2015 101.3 (H) 80.0 - 94.0 fL Final     MCH   Date Value Ref Range Status   04/17/2015 33.7 (H) 27.0 - 33.0 pg Final     MCHC   Date Value Ref Range Status   04/17/2015 33.2 33.0 - 37.0 g/dL Final     RDW   Date Value Ref Range Status   04/17/2015 11.9 11.5 - 14.5 % Final     MPV   Date Value Ref Range Status   04/17/2015 8.8 6.0 - 10.0 fL Final     Platelets   Date Value Ref Range Status   04/17/2015 170 130 - 400 K/Cumm Final     Neutrophil Rel %   Date Value Ref Range Status   04/17/2015 61.4 30.0 - 70.0 % Final     Lymphocyte Rel %   Date Value Ref Range Status   04/17/2015 28.9 21.0 - 51.0 % Final     Monocyte Rel %   Date Value Ref Range Status   04/17/2015 6.2 0.0 - 10.0 % Final     Eosinophil Rel %   Date Value Ref Range Status    04/17/2015 3.0 0.0 - 5.0 % Final     Basophil Rel %   Date Value Ref Range Status   04/17/2015 0.4 0.0 - 2.0 % Final     Neutrophils Absolute   Date Value Ref Range Status   04/17/2015 5.5 1.4 - 6.5 K/Cumm Final     Lymphocytes Absolute   Date Value Ref Range Status   04/17/2015 2.6 1.0 - 3.0 K/Cumm Final     Monocytes Absolute   Date Value Ref Range Status   04/17/2015 0.6 0.1 - 0.9 K/Cumm Final     Eosinophils Absolute   Date Value Ref Range Status   04/17/2015 0.3 0.0 - 0.7 K/Cumm Final     Basophils Absolute   Date Value Ref Range Status   04/17/2015 0.0 0.0 - 0.3 K/Cumm Final       Assessment & Plan   Diagnoses and all orders for this visit:    1. Paroxysmal atrial fibrillation (HCC) (Primary)  -     ECG 12 Lead  EKG, reviewed and discussed, no acute changes.  Patient remains in sinus rhythm, will plan to continue Toprol.  Patient remains asymptomatic.  Patient inquired about watchman's device, offered referral to EP.  He declines offer currently.    2. Chronic anticoagulation,eliquis  Continue on Eliquis 5 mg twice daily    3. Ventricular tachycardia, non-sustained  Stable, continue Toprol    4. Essential hypertension  Controlled, continue losartan/HCTZ and Toprol, sodium restrictions, routine monitoring     5. Mixed hyperlipidemia  Continue on Lipitor 20 mg nightly,  request most recent labs from PCP, heart healthy diet      Follow-up in 12 months with EKG, sooner if needed

## 2023-09-04 ENCOUNTER — HOSPITAL ENCOUNTER (INPATIENT)
Facility: HOSPITAL | Age: 73
LOS: 3 days | Discharge: HOME | DRG: 643 | End: 2023-09-07
Attending: STUDENT IN AN ORGANIZED HEALTH CARE EDUCATION/TRAINING PROGRAM | Admitting: INTERNAL MEDICINE
Payer: MEDICARE

## 2023-09-04 ENCOUNTER — APPOINTMENT (OUTPATIENT)
Dept: GENERAL RADIOLOGY | Facility: HOSPITAL | Age: 73
DRG: 643 | End: 2023-09-04
Payer: MEDICARE

## 2023-09-04 ENCOUNTER — APPOINTMENT (OUTPATIENT)
Dept: CT IMAGING | Facility: HOSPITAL | Age: 73
DRG: 643 | End: 2023-09-04
Payer: MEDICARE

## 2023-09-04 DIAGNOSIS — N17.9 ACUTE KIDNEY INJURY: ICD-10-CM

## 2023-09-04 DIAGNOSIS — E87.1 HYPONATREMIA: ICD-10-CM

## 2023-09-04 DIAGNOSIS — I48.91 ATRIAL FIBRILLATION WITH RAPID VENTRICULAR RESPONSE: Primary | ICD-10-CM

## 2023-09-04 LAB
ALBUMIN SERPL-MCNC: 3.6 G/DL (ref 3.5–5.2)
ALBUMIN/GLOB SERPL: 1 G/DL
ALP SERPL-CCNC: 63 U/L (ref 39–117)
ALT SERPL W P-5'-P-CCNC: 83 U/L (ref 1–41)
ANION GAP SERPL CALCULATED.3IONS-SCNC: 10.6 MMOL/L (ref 5–15)
ANION GAP SERPL CALCULATED.3IONS-SCNC: 13.7 MMOL/L (ref 5–15)
ANION GAP SERPL CALCULATED.3IONS-SCNC: 14.4 MMOL/L (ref 5–15)
ANION GAP SERPL CALCULATED.3IONS-SCNC: 17.4 MMOL/L (ref 5–15)
ANISOCYTOSIS BLD QL: ABNORMAL
AST SERPL-CCNC: 146 U/L (ref 1–40)
ATMOSPHERIC PRESS: 726 MMHG
BACTERIA UR QL AUTO: ABNORMAL /HPF
BASE EXCESS BLDV CALC-SCNC: -5.9 MMOL/L (ref 0–2)
BDY SITE: ABNORMAL
BILIRUB SERPL-MCNC: 0.9 MG/DL (ref 0–1.2)
BILIRUB UR QL STRIP: ABNORMAL
BUN SERPL-MCNC: 27 MG/DL (ref 8–23)
BUN SERPL-MCNC: 33 MG/DL (ref 8–23)
BUN SERPL-MCNC: 35 MG/DL (ref 8–23)
BUN SERPL-MCNC: 37 MG/DL (ref 8–23)
BUN/CREAT SERPL: 24 (ref 7–25)
BUN/CREAT SERPL: 26 (ref 7–25)
BUN/CREAT SERPL: 26.2 (ref 7–25)
BUN/CREAT SERPL: 28 (ref 7–25)
CALCIUM SPEC-SCNC: 6.8 MG/DL (ref 8.6–10.5)
CALCIUM SPEC-SCNC: 7 MG/DL (ref 8.6–10.5)
CALCIUM SPEC-SCNC: 7.3 MG/DL (ref 8.6–10.5)
CALCIUM SPEC-SCNC: 8.1 MG/DL (ref 8.6–10.5)
CHLORIDE SERPL-SCNC: 88 MMOL/L (ref 98–107)
CHLORIDE SERPL-SCNC: 92 MMOL/L (ref 98–107)
CHLORIDE SERPL-SCNC: 92 MMOL/L (ref 98–107)
CHLORIDE SERPL-SCNC: 94 MMOL/L (ref 98–107)
CK SERPL-CCNC: 2261 U/L (ref 20–200)
CLARITY UR: CLEAR
CO2 BLDA-SCNC: 17.1 MMOL/L (ref 22–33)
CO2 SERPL-SCNC: 12.3 MMOL/L (ref 22–29)
CO2 SERPL-SCNC: 13.6 MMOL/L (ref 22–29)
CO2 SERPL-SCNC: 13.6 MMOL/L (ref 22–29)
CO2 SERPL-SCNC: 16.4 MMOL/L (ref 22–29)
COHGB MFR BLD: 1.2 % (ref 0–5)
COLOR UR: ABNORMAL
CREAT SERPL-MCNC: 1.04 MG/DL (ref 0.76–1.27)
CREAT SERPL-MCNC: 1.25 MG/DL (ref 0.76–1.27)
CREAT SERPL-MCNC: 1.26 MG/DL (ref 0.76–1.27)
CREAT SERPL-MCNC: 1.54 MG/DL (ref 0.76–1.27)
DEPRECATED RDW RBC AUTO: 44.7 FL (ref 37–54)
DEPRECATED RDW RBC AUTO: 45.6 FL (ref 37–54)
EGFRCR SERPLBLD CKD-EPI 2021: 47.6 ML/MIN/1.73
EGFRCR SERPLBLD CKD-EPI 2021: 60.6 ML/MIN/1.73
EGFRCR SERPLBLD CKD-EPI 2021: 61.2 ML/MIN/1.73
EGFRCR SERPLBLD CKD-EPI 2021: 76.3 ML/MIN/1.73
ERYTHROCYTE [DISTWIDTH] IN BLOOD BY AUTOMATED COUNT: 12.4 % (ref 12.3–15.4)
ERYTHROCYTE [DISTWIDTH] IN BLOOD BY AUTOMATED COUNT: 12.6 % (ref 12.3–15.4)
GEN 5 2HR TROPONIN T REFLEX: 44 NG/L
GLOBULIN UR ELPH-MCNC: 3.5 GM/DL
GLUCOSE SERPL-MCNC: 113 MG/DL (ref 65–99)
GLUCOSE SERPL-MCNC: 116 MG/DL (ref 65–99)
GLUCOSE SERPL-MCNC: 116 MG/DL (ref 65–99)
GLUCOSE SERPL-MCNC: 146 MG/DL (ref 65–99)
GLUCOSE UR STRIP-MCNC: NEGATIVE MG/DL
HCO3 BLDV-SCNC: 16.4 MMOL/L (ref 22–28)
HCT VFR BLD AUTO: 40.4 % (ref 37.5–51)
HCT VFR BLD AUTO: 46.9 % (ref 37.5–51)
HGB BLD-MCNC: 13.7 G/DL (ref 13–17.7)
HGB BLD-MCNC: 16.5 G/DL (ref 13–17.7)
HGB BLDA-MCNC: 14.9 G/DL (ref 14–18)
HGB UR QL STRIP.AUTO: ABNORMAL
HOLD SPECIMEN: NORMAL
HOLD SPECIMEN: NORMAL
HYALINE CASTS UR QL AUTO: ABNORMAL /LPF
INHALED O2 CONCENTRATION: 21 %
KETONES UR QL STRIP: ABNORMAL
LEUKOCYTE ESTERASE UR QL STRIP.AUTO: NEGATIVE
LYMPHOCYTES # BLD MANUAL: 1.95 10*3/MM3 (ref 0.7–3.1)
LYMPHOCYTES # BLD MANUAL: 2.21 10*3/MM3 (ref 0.7–3.1)
LYMPHOCYTES NFR BLD MANUAL: 8 % (ref 5–12)
LYMPHOCYTES NFR BLD MANUAL: 9 % (ref 5–12)
Lab: ABNORMAL
MCH RBC QN AUTO: 33.4 PG (ref 26.6–33)
MCH RBC QN AUTO: 34 PG (ref 26.6–33)
MCHC RBC AUTO-ENTMCNC: 33.9 G/DL (ref 31.5–35.7)
MCHC RBC AUTO-ENTMCNC: 35.2 G/DL (ref 31.5–35.7)
MCV RBC AUTO: 96.7 FL (ref 79–97)
MCV RBC AUTO: 98.5 FL (ref 79–97)
METHGB BLD QL: 0.5 % (ref 0–3)
MODALITY: ABNORMAL
MONOCYTES # BLD: 1.17 10*3/MM3 (ref 0.1–0.9)
MONOCYTES # BLD: 1.3 10*3/MM3 (ref 0.1–0.9)
NEUTROPHILS # BLD AUTO: 12.99 10*3/MM3 (ref 1.7–7)
NEUTROPHILS # BLD AUTO: 9.63 10*3/MM3 (ref 1.7–7)
NEUTROPHILS NFR BLD MANUAL: 74 % (ref 42.7–76)
NEUTROPHILS NFR BLD MANUAL: 77 % (ref 42.7–76)
NEUTS BAND NFR BLD MANUAL: 3 % (ref 0–5)
NITRITE UR QL STRIP: NEGATIVE
OXYHGB MFR BLDV: 84.1 % (ref 45–75)
PCO2 BLDV: 24.5 MM HG (ref 41–51)
PH BLDV: 7.43 PH UNITS (ref 7.32–7.42)
PH UR STRIP.AUTO: 5.5 [PH] (ref 5–8)
PLAT MORPH BLD: NORMAL
PLAT MORPH BLD: NORMAL
PLATELET # BLD AUTO: 117 10*3/MM3 (ref 140–450)
PLATELET # BLD AUTO: 136 10*3/MM3 (ref 140–450)
PMV BLD AUTO: 10.2 FL (ref 6–12)
PMV BLD AUTO: 9.6 FL (ref 6–12)
PO2 BLDV: 49 MM HG (ref 27–53)
POTASSIUM SERPL-SCNC: 3.5 MMOL/L (ref 3.5–5.2)
POTASSIUM SERPL-SCNC: 4.1 MMOL/L (ref 3.5–5.2)
POTASSIUM SERPL-SCNC: 4.2 MMOL/L (ref 3.5–5.2)
POTASSIUM SERPL-SCNC: 4.6 MMOL/L (ref 3.5–5.2)
PROT SERPL-MCNC: 7.1 G/DL (ref 6–8.5)
PROT UR QL STRIP: ABNORMAL
RBC # BLD AUTO: 4.1 10*6/MM3 (ref 4.14–5.8)
RBC # BLD AUTO: 4.85 10*6/MM3 (ref 4.14–5.8)
RBC # UR STRIP: ABNORMAL /HPF
RBC MORPH BLD: NORMAL
REF LAB TEST METHOD: ABNORMAL
SAO2 % BLDCOV: 85.6 % (ref 45–75)
SARS-COV-2 RNA RESP QL NAA+PROBE: NOT DETECTED
SODIUM SERPL-SCNC: 119 MMOL/L (ref 136–145)
SODIUM SERPL-SCNC: 119 MMOL/L (ref 136–145)
SODIUM SERPL-SCNC: 120 MMOL/L (ref 136–145)
SODIUM SERPL-SCNC: 120 MMOL/L (ref 136–145)
SODIUM UR-SCNC: 26 MMOL/L
SP GR UR STRIP: 1.02 (ref 1–1.03)
SQUAMOUS #/AREA URNS HPF: ABNORMAL /HPF
TOXIC GRANULATION: ABNORMAL
TROPONIN T DELTA: -11 NG/L
TROPONIN T SERPL HS-MCNC: 55 NG/L
TSH SERPL DL<=0.05 MIU/L-ACNC: 3.53 UIU/ML (ref 0.27–4.2)
UROBILINOGEN UR QL STRIP: ABNORMAL
VARIANT LYMPHS NFR BLD MANUAL: 12 % (ref 19.6–45.3)
VARIANT LYMPHS NFR BLD MANUAL: 17 % (ref 19.6–45.3)
VENTILATOR MODE: ABNORMAL
WBC # UR STRIP: ABNORMAL /HPF
WBC NRBC COR # BLD: 13.02 10*3/MM3 (ref 3.4–10.8)
WBC NRBC COR # BLD: 16.24 10*3/MM3 (ref 3.4–10.8)
WHOLE BLOOD HOLD COAG: NORMAL
WHOLE BLOOD HOLD SPECIMEN: NORMAL

## 2023-09-04 PROCEDURE — 71045 X-RAY EXAM CHEST 1 VIEW: CPT

## 2023-09-04 PROCEDURE — 85025 COMPLETE CBC W/AUTO DIFF WBC: CPT | Performed by: STUDENT IN AN ORGANIZED HEALTH CARE EDUCATION/TRAINING PROGRAM

## 2023-09-04 PROCEDURE — 93005 ELECTROCARDIOGRAM TRACING: CPT | Performed by: STUDENT IN AN ORGANIZED HEALTH CARE EDUCATION/TRAINING PROGRAM

## 2023-09-04 PROCEDURE — 80053 COMPREHEN METABOLIC PANEL: CPT | Performed by: STUDENT IN AN ORGANIZED HEALTH CARE EDUCATION/TRAINING PROGRAM

## 2023-09-04 PROCEDURE — 84300 ASSAY OF URINE SODIUM: CPT | Performed by: INTERNAL MEDICINE

## 2023-09-04 PROCEDURE — 82805 BLOOD GASES W/O2 SATURATION: CPT

## 2023-09-04 PROCEDURE — 99285 EMERGENCY DEPT VISIT HI MDM: CPT

## 2023-09-04 PROCEDURE — 36415 COLL VENOUS BLD VENIPUNCTURE: CPT

## 2023-09-04 PROCEDURE — 84443 ASSAY THYROID STIM HORMONE: CPT | Performed by: INTERNAL MEDICINE

## 2023-09-04 PROCEDURE — 84484 ASSAY OF TROPONIN QUANT: CPT | Performed by: STUDENT IN AN ORGANIZED HEALTH CARE EDUCATION/TRAINING PROGRAM

## 2023-09-04 PROCEDURE — 85025 COMPLETE CBC W/AUTO DIFF WBC: CPT | Performed by: INTERNAL MEDICINE

## 2023-09-04 PROCEDURE — 87635 SARS-COV-2 COVID-19 AMP PRB: CPT | Performed by: INTERNAL MEDICINE

## 2023-09-04 PROCEDURE — 82550 ASSAY OF CK (CPK): CPT | Performed by: INTERNAL MEDICINE

## 2023-09-04 PROCEDURE — 83935 ASSAY OF URINE OSMOLALITY: CPT | Performed by: INTERNAL MEDICINE

## 2023-09-04 PROCEDURE — 71045 X-RAY EXAM CHEST 1 VIEW: CPT | Performed by: RADIOLOGY

## 2023-09-04 PROCEDURE — 70450 CT HEAD/BRAIN W/O DYE: CPT

## 2023-09-04 PROCEDURE — 82820 HEMOGLOBIN-OXYGEN AFFINITY: CPT

## 2023-09-04 PROCEDURE — 81001 URINALYSIS AUTO W/SCOPE: CPT | Performed by: STUDENT IN AN ORGANIZED HEALTH CARE EDUCATION/TRAINING PROGRAM

## 2023-09-04 PROCEDURE — 85007 BL SMEAR W/DIFF WBC COUNT: CPT | Performed by: STUDENT IN AN ORGANIZED HEALTH CARE EDUCATION/TRAINING PROGRAM

## 2023-09-04 PROCEDURE — 85007 BL SMEAR W/DIFF WBC COUNT: CPT | Performed by: INTERNAL MEDICINE

## 2023-09-04 RX ORDER — SODIUM CHLORIDE 9 MG/ML
125 INJECTION, SOLUTION INTRAVENOUS CONTINUOUS
Status: DISCONTINUED | OUTPATIENT
Start: 2023-09-04 | End: 2023-09-04

## 2023-09-04 RX ORDER — NITROGLYCERIN 0.4 MG/1
0.4 TABLET SUBLINGUAL
Status: DISCONTINUED | OUTPATIENT
Start: 2023-09-04 | End: 2023-09-07 | Stop reason: HOSPADM

## 2023-09-04 RX ORDER — DILTIAZEM HYDROCHLORIDE 5 MG/ML
10 INJECTION INTRAVENOUS ONCE
Status: COMPLETED | OUTPATIENT
Start: 2023-09-04 | End: 2023-09-04

## 2023-09-04 RX ORDER — BISACODYL 5 MG/1
5 TABLET, DELAYED RELEASE ORAL DAILY PRN
Status: DISCONTINUED | OUTPATIENT
Start: 2023-09-04 | End: 2023-09-07 | Stop reason: HOSPADM

## 2023-09-04 RX ORDER — SODIUM CHLORIDE 9 MG/ML
40 INJECTION, SOLUTION INTRAVENOUS AS NEEDED
Status: DISCONTINUED | OUTPATIENT
Start: 2023-09-04 | End: 2023-09-07 | Stop reason: HOSPADM

## 2023-09-04 RX ORDER — METOPROLOL TARTRATE 50 MG/1
50 TABLET, FILM COATED ORAL EVERY 12 HOURS SCHEDULED
Status: DISCONTINUED | OUTPATIENT
Start: 2023-09-04 | End: 2023-09-05

## 2023-09-04 RX ORDER — HYDROXYZINE HYDROCHLORIDE 25 MG/1
25 TABLET, FILM COATED ORAL 3 TIMES DAILY PRN
Status: DISCONTINUED | OUTPATIENT
Start: 2023-09-04 | End: 2023-09-07 | Stop reason: HOSPADM

## 2023-09-04 RX ORDER — SODIUM CHLORIDE 0.9 % (FLUSH) 0.9 %
10 SYRINGE (ML) INJECTION AS NEEDED
Status: DISCONTINUED | OUTPATIENT
Start: 2023-09-04 | End: 2023-09-07 | Stop reason: HOSPADM

## 2023-09-04 RX ORDER — ZOLPIDEM TARTRATE 5 MG/1
5 TABLET ORAL NIGHTLY PRN
Status: DISCONTINUED | OUTPATIENT
Start: 2023-09-04 | End: 2023-09-07 | Stop reason: HOSPADM

## 2023-09-04 RX ORDER — SODIUM CHLORIDE 0.9 % (FLUSH) 0.9 %
10 SYRINGE (ML) INJECTION EVERY 12 HOURS SCHEDULED
Status: DISCONTINUED | OUTPATIENT
Start: 2023-09-04 | End: 2023-09-07 | Stop reason: HOSPADM

## 2023-09-04 RX ORDER — METOPROLOL TARTRATE 5 MG/5ML
2.5 INJECTION INTRAVENOUS ONCE
Status: COMPLETED | OUTPATIENT
Start: 2023-09-04 | End: 2023-09-04

## 2023-09-04 RX ORDER — 3% SODIUM CHLORIDE 3 G/100ML
50 INJECTION, SOLUTION INTRAVENOUS ONCE
Status: COMPLETED | OUTPATIENT
Start: 2023-09-05 | End: 2023-09-05

## 2023-09-04 RX ORDER — BISACODYL 10 MG
10 SUPPOSITORY, RECTAL RECTAL DAILY PRN
Status: DISCONTINUED | OUTPATIENT
Start: 2023-09-04 | End: 2023-09-07 | Stop reason: HOSPADM

## 2023-09-04 RX ORDER — ASPIRIN 81 MG/1
324 TABLET, CHEWABLE ORAL ONCE
Status: COMPLETED | OUTPATIENT
Start: 2023-09-04 | End: 2023-09-04

## 2023-09-04 RX ORDER — POLYETHYLENE GLYCOL 3350 17 G/17G
17 POWDER, FOR SOLUTION ORAL DAILY PRN
Status: DISCONTINUED | OUTPATIENT
Start: 2023-09-04 | End: 2023-09-07 | Stop reason: HOSPADM

## 2023-09-04 RX ORDER — AMOXICILLIN 250 MG
2 CAPSULE ORAL 2 TIMES DAILY
Status: DISCONTINUED | OUTPATIENT
Start: 2023-09-04 | End: 2023-09-07 | Stop reason: HOSPADM

## 2023-09-04 RX ADMIN — SODIUM CHLORIDE 5 MG/HR: 900 INJECTION, SOLUTION INTRAVENOUS at 11:51

## 2023-09-04 RX ADMIN — APIXABAN 5 MG: 5 TABLET, FILM COATED ORAL at 22:25

## 2023-09-04 RX ADMIN — Medication 10 ML: at 20:42

## 2023-09-04 RX ADMIN — ASPIRIN 324 MG: 81 TABLET, CHEWABLE ORAL at 10:45

## 2023-09-04 RX ADMIN — SODIUM BICARBONATE 150 MEQ: 84 INJECTION, SOLUTION INTRAVENOUS at 20:58

## 2023-09-04 RX ADMIN — SODIUM CHLORIDE 125 ML/HR: 9 INJECTION, SOLUTION INTRAVENOUS at 16:51

## 2023-09-04 RX ADMIN — SODIUM CHLORIDE 500 ML: 9 INJECTION, SOLUTION INTRAVENOUS at 15:06

## 2023-09-04 RX ADMIN — DILTIAZEM HYDROCHLORIDE 10 MG: 5 INJECTION, SOLUTION INTRAVENOUS at 11:13

## 2023-09-04 RX ADMIN — Medication 10 ML: at 15:07

## 2023-09-04 RX ADMIN — METOPROLOL TARTRATE 2.5 MG: 1 INJECTION, SOLUTION INTRAVENOUS at 11:52

## 2023-09-04 RX ADMIN — DILTIAZEM HYDROCHLORIDE 10 MG: 5 INJECTION, SOLUTION INTRAVENOUS at 10:45

## 2023-09-04 RX ADMIN — ZOLPIDEM TARTRATE 5 MG: 5 TABLET ORAL at 21:08

## 2023-09-04 RX ADMIN — METOPROLOL TARTRATE 50 MG: 50 TABLET, FILM COATED ORAL at 20:42

## 2023-09-04 RX ADMIN — DOCUSATE SODIUM 50 MG AND SENNOSIDES 8.6 MG 2 TABLET: 8.6; 5 TABLET, FILM COATED ORAL at 20:41

## 2023-09-04 NOTE — PLAN OF CARE
Goal Outcome Evaluation:  Plan of Care Reviewed With: patient, spouse        Progress: no change  Outcome Evaluation: Pt admitted from ED this shift. A/Ox4. Pt in sinus tach on monitor; otherwise VSS on RA. No c/o chest pain or shortness of breath. Pt has labored breathing and is using accessory muscles when breathing. Cardizem gtt infusing, pt tolerating well. Spouse at bedside. No new needs identified at this time. Safety maintained. Will continue to follow plan of care.

## 2023-09-04 NOTE — H&P
[facility] Medicine Services  History & Physical    Patient Identification:  Name:  Clinton Hernandez  Age:  72 y.o.  Sex:  male  :  1950  MRN:  [id number]   Visit Number:  [id number]  Admit Date: 2023   Primary Care Physician:  Caroline Fernando MD    Subjective     Chief complaint: Weakness, shortness of breath    History of presenting illness:      Clinton Hernandez is a 72 y.o. male who presented for further evaluation of shortness of breath and weakness over the past 3-4 days. He also complaints some dizziness/light headedness. He has had some palpitations but didn't think they were much different than baseline given his a fib history. He reports has been so weak that he has had a couple of falls over the past few days. Denies any injury or loss of consciousness.He reports compliance with his home med regimen. He denies recent decreased or increased fluid intake. He denies dysuria, abdominal, or flank pain. He does note urine has been very dark over the past few days. He denies hamilton hematuria. He reports recent chills/subjective fever. No cough. No diarrhea. Patient is taking losartan-hctz and has been on this medication for quite some time. He doesn't think there have been any dosing changes recently. Denies any lower extremity edema. He reports when he was not feeling well he did suspect covid but home test was negative.    Past medical history is significant for paroxysmal atrial fibrillation, hypothyroidism, HLD, HTN, rheumatoid aortitis, T2DM    Upon arrival to the ED, vital signs were temp 98.2, heart rate 182, respirations 28, /46, SPO2 94% on room air.  EKG notes A-fib with RVR, rate 191.  HS troponin is 55 with repeat at 44.  Sodium on arrival 119 with repeat post fluids at 120.  Creatinine on arrival 1.54 with repeat at 1.25-baseline appears to be 0.8-1.  Calcium is low at 8.1.  Glucose 146.  CBC with WBC count 16.24.  Platelets are low at 136, neutrophil percentage  77.0.  CXR notes minimal diffuse pulmonary opacity due to scarring.  No dense consolidation.    Known Emergency Department medications received prior to my evaluation included full dose aspirin, diltiazem injection x2, Lopressor injection, now on diltiazem drip.   Room location at the time of my evaluation was 221.     ---------------------------------------------------------------------------------------------------------------------   Review of Systems   Constitutional:  Positive for chills, fatigue and fever.   HENT:  Negative for congestion and rhinorrhea.    Respiratory:  Positive for shortness of breath. Negative for cough.    Cardiovascular:  Positive for palpitations. Negative for chest pain and leg swelling.   Gastrointestinal:  Negative for abdominal pain, constipation, diarrhea, nausea and vomiting.   Genitourinary:  Negative for difficulty urinating and dysuria.        Dark colored urine   Musculoskeletal:  Negative for arthralgias and myalgias.   Skin:  Negative for rash and wound.   Neurological:  Positive for dizziness, weakness and light-headedness. Negative for syncope.      ---------------------------------------------------------------------------------------------------------------------   Past Medical History:   Diagnosis Date    Abnormal ECG 2021    Arrhythmia 2022    Atrial fibrillation     Bowel trouble     Diabetes mellitus     Hyperlipidemia     Hypertension     Hypothyroid     Rheumatoid aortitis      Past Surgical History:   Procedure Laterality Date    CARDIAC ABLATION      CATHETER ATRIAL SUPRAVENTRICULAR TACHYCARDIA    CARDIAC CATHETERIZATION  1990    CARPAL TUNNEL RELEASE      COLECTOMY PARTIAL / TOTAL      HERNIA REPAIR      KNEE ARTHROSCOPY      BOTH KNEES     Family History   Problem Relation Age of Onset    Cancer Mother     Diabetes Father     Heart disease Father     Heart attack Father     Diabetes Other     Heart disease Other      Social History     Socioeconomic History     Marital status:    Tobacco Use    Smoking status: Former     Packs/day: 0.50     Years: 2.00     Pack years: 1.00     Types: Cigarettes     Start date: 1975     Quit date: 1978     Years since quittin.7    Smokeless tobacco: Never   Substance and Sexual Activity    Alcohol use: Not Currently     Comment: occasional    Drug use: Never    Sexual activity: Not Currently     Partners: Female     ---------------------------------------------------------------------------------------------------------------------   Allergies:  Patient has no known allergies.  ---------------------------------------------------------------------------------------------------------------------   Home medications:    Medications below are reported home medications pulling from within the system; at this time, these medications have not been reconciled unless otherwise specified and are in the verification process for further verifcation as current home medications.  Medications Prior to Admission   Medication Sig Dispense Refill Last Dose    aspirin 81 MG tablet Take  by mouth.       atorvastatin (LIPITOR) 20 MG tablet Take 20 mg by mouth Every Night.       azelastine (ASTELIN) 0.1 % nasal spray        coenzyme Q10 100 MG capsule Take 200 mg by mouth Daily.       Eliquis 5 MG tablet tablet Take 1 tablet by mouth Every 12 (Twelve) Hours. 28 tablet 0     levothyroxine (SYNTHROID, LEVOTHROID) 75 MCG tablet Take 75 mcg by mouth Daily.       losartan-hydrochlorothiazide (HYZAAR) 100-12.5 MG per tablet Take  by mouth Daily.       metFORMIN ER (GLUCOPHAGE-XR) 500 MG 24 hr tablet Take 500 mg by mouth 2 (Two) Times a Day.       metoprolol succinate XL (TOPROL-XL) 100 MG 24 hr tablet Take 100 mg by mouth Every Morning.       multivitamin with minerals tablet tablet Take 1 tablet by mouth Daily.       pantoprazole (PROTONIX) 40 MG EC tablet Take 40 mg by mouth Daily.       sertraline (ZOLOFT) 100 MG tablet Take  by mouth.        Upadacitinib ER (Rinvoq) 15 MG tablet sustained-release 24 hour Take 15 mg by mouth Daily. rheumatology       zolpidem (AMBIEN) 10 MG tablet Take 10 mg by mouth Every Night.          Hospital Scheduled Meds:  apixaban, 5 mg, Oral, Q12H  senna-docusate sodium, 2 tablet, Oral, BID  sodium chloride, 500 mL, Intravenous, Once  sodium chloride, 10 mL, Intravenous, Q12H      dilTIAZem, 5-15 mg/hr, Last Rate: 5 mg/hr (09/04/23 1151)        Current listed hospital scheduled medications may not yet reflect those currently placed in orders that are signed and held awaiting patient's arrival to floor.   ---------------------------------------------------------------------------------------------------------------------     Objective     Vital Signs:  Temp:  [98.2 °F (36.8 °C)] 98.2 °F (36.8 °C)  Heart Rate:  [106-182] 109  Resp:  [28] 28  BP: (101-133)/() 112/72      09/04/23  1024   Weight: 97.1 kg (214 lb)     Body mass index is 33.52 kg/m².  ---------------------------------------------------------------------------------------------------------------------       Physical Exam  Vitals and nursing note reviewed.   Constitutional:       General: He is not in acute distress.     Appearance: He is ill-appearing.   HENT:      Head: Normocephalic and atraumatic.   Eyes:      Extraocular Movements: Extraocular movements intact.      Conjunctiva/sclera: Conjunctivae normal.   Cardiovascular:      Rate and Rhythm: Regular rhythm. Tachycardia present.   Pulmonary:      Breath sounds: Normal breath sounds.      Comments: Increased effort  Abdominal:      Palpations: Abdomen is soft.      Tenderness: There is no abdominal tenderness.   Musculoskeletal:      Right lower leg: No edema.      Left lower leg: No edema.   Skin:     General: Skin is warm and dry.   Neurological:      Mental Status: He is alert. Mental status is at baseline.   Psychiatric:         Mood and Affect: Mood normal.         Behavior: Behavior normal.              ---------------------------------------------------------------------------------------------------------------------  EKG:        I have personally looked at the EKG.  ---------------------------------------------------------------------------------------------------------------------   Results from last 7 days   Lab Units 09/04/23  1046   WBC 10*3/mm3 16.24*   HEMOGLOBIN g/dL 16.5   HEMATOCRIT % 46.9   MCV fL 96.7   MCHC g/dL 35.2   PLATELETS 10*3/mm3 136*         Results from last 7 days   Lab Units 09/04/23  1309 09/04/23  1046   SODIUM mmol/L 120* 119*   POTASSIUM mmol/L 4.1 4.2   CHLORIDE mmol/L 92* 88*   CO2 mmol/L 13.6* 13.6*   BUN mg/dL 35* 37*   CREATININE mg/dL 1.25 1.54*   CALCIUM mg/dL 7.3* 8.1*   GLUCOSE mg/dL 116* 146*   ALBUMIN g/dL  --  3.6   BILIRUBIN mg/dL  --  0.9   ALK PHOS U/L  --  63   AST (SGOT) U/L  --  146*   ALT (SGPT) U/L  --  83*   Estimated Creatinine Clearance: 59.3 mL/min (by C-G formula based on SCr of 1.25 mg/dL).  No results found for: AMMONIA  Results from last 7 days   Lab Units 09/04/23  1309 09/04/23  1046   HSTROP T ng/L 44* 55*         Lab Results   Component Value Date    HGBA1C 5.9 (H) 04/20/2015     Lab Results   Component Value Date    TSH 3.530 09/04/2023     No results found for: PREGTESTUR, PREGSERUM, HCG, HCGQUANT  Pain Management Panel           No data to display              No results found for: BLOODCX  No results found for: URINECX  No results found for: WOUNDCX  No results found for: STOOLCX      ---------------------------------------------------------------------------------------------------------------------  Imaging Results (Last 7 Days)       Procedure Component Value Units Date/Time    XR Chest 1 View [[id number]] Collected: 09/04/23 1142     Updated: 09/04/23 1203    Narrative:         Comparison: No previous.     Technique: Chest x-ray to upright portable AP views performed according  to as low as reasonably achieved dose protocol.      Findings: There are scattered areas of minimal scarring, atelectasis  and/or interstitial infiltrate throughout both lungs.  No dense  atelectasis or consolidation.  There is no pneumothorax or pleural fluid  collection.  No discrete nodules or masses are identified.  Heart size  and mediastinal contour are normal.  The bony structures appear intact  with minimal degenerative changes.       Impression:      Impression:  1.  Minimal diffuse pulmonary opacity due to scarring, atelectasis  and/or interstitial infiltrate.  2.  No dense atelectasis or consolidation.     This report was finalized on 9/4/2023 11:44 AM by Floyd Felix MD.               Cultures:  No results found for: BLOODCX, URINECX, WOUNDCX, MRSACX, RESPCX, STOOLCX    Last echocardiogram:  Results for orders placed during the hospital encounter of 11/10/21    Adult Transthoracic Echo Complete W/ Cont if Necessary Per Protocol    Interpretation Summary  · Normal left ventricular cavity size and wall thickness noted.  · Left ventricular ejection fraction appears to be 61 - 65%. Left ventricular systolic function is normal.  · Left ventricular diastolic function is consistent with (grade I) impaired relaxation.  · The aortic valve is structurally normal with no regurgitation or stenosis present. The aortic valve appears trileaflet.  · The mitral valve is structurally normal with no regurgitation or significant stenosis present  · Mild tricuspid valve regurgitation is present. Mild pulmonary hypertension is present.  · There is mild pulmonic valve regurgitation present.  · The pericardium is normal. There is no evidence of pericardial effusion.  · Primary rhythm was sinus but pt developed transient arrhythmia during the study which needs further evaluation. Two week long holter monitor is recommanded.          I have personally reviewed the above radiology images and read the final radiology report on  09/04/23  ---------------------------------------------------------------------------------------------------------------------  Assessment / Plan     Active Hospital Problems    Diagnosis  POA    **Hyponatremia [E87.1]  Yes       ASSESSMENT/PLAN:    A-fib with RVR, POA  Dyspnea, POA, possibly 2/2 above  Elevated HS troponin likely 2/2 tachycardia  Severe hyponatremia, POA  Generalized weakness, POA, likely multifactorial given above  Borderline hypotension, likely 2/2 tachycardia  KAL, POA, improving, likely prerenal given above  Patient presents secondary to weakness and shortness of breath.  Notes recent palpitations.  EKG confirms A-fib with RVR rate 191.  Patient did receive Lopressor and diltiazem in the ED though ultimately required initiation of diltiazem infusion.  Rate has improved, most recently around 100 bpm.  Also noted with severe hyponatremia on arrival at 119 which has improved following normal saline to 120.  Creatinine on arrival 1.54 and did improve to 1.25 following fluid bolus.  Baseline is 0.8-1.0.  Also with leukocytosis on arrival possibly reactive or 2/2 dehydration.  No fever.  CXR is negative for consolidation.  UA is pending.  Admit to PCU for further management with continuous cardiac monitoring/pulse oximetry  Continue diltiazem infusion for now and titrate as needed  Continue home Eliquis  We will add additional 500 mL bolus and timed repeat BMP for later this afternoon  Random urine sodium, urine osmolality are pending  Repeat CBC, BMP in the a.m.  Monitor I's and O's.  Avoid nephrotoxins as able  Supportive care    Chronic:  Hypothyroidism  HLD  HTN  Rheumatoid arthritis  T2DM  Plan to restart home meds as indicated per med rec  Patient appears to be on metformin.  Will likely need to hold.  Can initiate SSI coverage with Accu-Cheks to monitor and hypoglycemia protocol in place as indicated.  Hold antihypertensives as needed for hypotension     ----------  -DVT prophylaxis:  Chronically anticoagulated with Eliquis  -Activity: As tolerated  -Expected length of stay: INPATIENT status due to the need for care which can only be reasonably provided in an hospital setting such as aggressive/expedited ancillary services and/or consultation services, the necessity for IV medications, close physician monitoring and/or the possible need for procedures.  In such, I feel patient’s risk for adverse outcomes and need for care warrant INPATIENT evaluation and predict the patient’s care encounter to likely last beyond 2 midnights.   -Disposition pending course    High risk secondary to  a fib rvr, joshua, hyponatremia    There are no questions and answers to display.       Juanito Mejias PA-C   09/04/23  14:35 EDT

## 2023-09-04 NOTE — ED NOTES
Patient reports feeling flushed and hot. Patient's temp retaken at this time and the temp is 98.8. Dr. Armenta notified at this time.

## 2023-09-04 NOTE — ED PROVIDER NOTES
Subjective   History of Present Illness  72-year-old male with past medical history of hypertension, hyperlipidemia, atrial fibrillation currently on Eliquis, and hypothyroidism presents to the ER due to concerns for increasing weakness and shortness of breath.  No chest pain.  No fever or chills.  Patient confirmed palpitations.  Heart rate of 170 noted on ER triage evaluation.  Concerns for atrial fibrillation with rapid ventricular response noted.  Patient was merely transition to an ER room.  IV fluids initiated.  Blood pressure otherwise stable.  Afebrile    Review of Systems   Constitutional:  Positive for fatigue.   Respiratory:  Positive for shortness of breath.    Cardiovascular:  Positive for palpitations.   Neurological:  Positive for weakness.   All other systems reviewed and are negative.    Past Medical History:   Diagnosis Date    Abnormal ECG     Arrhythmia     Atrial fibrillation     Bowel trouble     Diabetes mellitus     Hyperlipidemia     Hypertension     Hypothyroid     Rheumatoid aortitis        No Known Allergies    Past Surgical History:   Procedure Laterality Date    CARDIAC ABLATION      CATHETER ATRIAL SUPRAVENTRICULAR TACHYCARDIA    CARDIAC CATHETERIZATION      CARPAL TUNNEL RELEASE      COLECTOMY PARTIAL / TOTAL      HERNIA REPAIR      KNEE ARTHROSCOPY      BOTH KNEES       Family History   Problem Relation Age of Onset    Cancer Mother     Diabetes Father     Heart disease Father     Heart attack Father     Diabetes Other     Heart disease Other        Social History     Socioeconomic History    Marital status:    Tobacco Use    Smoking status: Former     Packs/day: 0.50     Years: 2.00     Pack years: 1.00     Types: Cigarettes     Start date: 1975     Quit date: 1978     Years since quittin.7    Smokeless tobacco: Never   Substance and Sexual Activity    Alcohol use: Not Currently     Comment: occasional    Drug use: Never    Sexual activity: Not  Currently     Partners: Female           Objective   Physical Exam  Constitutional:       General: He is not in acute distress.     Appearance: He is well-developed. He is not ill-appearing.   HENT:      Head: Normocephalic and atraumatic.   Eyes:      Extraocular Movements: Extraocular movements intact.      Pupils: Pupils are equal, round, and reactive to light.   Neck:      Vascular: No JVD.   Cardiovascular:      Rate and Rhythm: Tachycardia present. Rhythm irregularly irregular.      Heart sounds: Normal heart sounds. No murmur heard.  Pulmonary:      Effort: No tachypnea, accessory muscle usage or respiratory distress.      Breath sounds: Normal breath sounds. No stridor. No decreased breath sounds, wheezing, rhonchi or rales.   Chest:      Chest wall: No deformity, tenderness or crepitus.   Abdominal:      General: Bowel sounds are normal.      Palpations: Abdomen is soft.      Tenderness: There is no abdominal tenderness. There is no guarding or rebound.   Musculoskeletal:         General: Normal range of motion.      Cervical back: Normal range of motion and neck supple.      Right lower leg: No tenderness. No edema.      Left lower leg: No tenderness. No edema.   Lymphadenopathy:      Cervical: No cervical adenopathy.   Skin:     General: Skin is warm and dry.      Coloration: Skin is not cyanotic.      Findings: No ecchymosis or erythema.   Neurological:      General: No focal deficit present.      Mental Status: He is alert and oriented to person, place, and time.      Cranial Nerves: No cranial nerve deficit.      Motor: No weakness.   Psychiatric:         Mood and Affect: Mood normal. Mood is not anxious.         Behavior: Behavior normal. Behavior is not agitated.       Procedures           ED Course  ED Course as of 09/04/23 1316   Mon Sep 04, 2023   1030 EKG notes atrial fibrillation.  191 bpm.  Rapid ventricular response.  No acute ST elevation.  QTc 499.  Electronically signed by Shilo Armenta  , 09/04/23, 10:30 AM EDT.   [SF]   1315 Repeat EKG notes sinus tachycardia.  105 bpm.  No acute ST elevation.  Electronically signed by Shilo Armenta DO, 09/04/23, 1:16 PM EDT.   [SF]      ED Course User Index  [SF] Shilo Armenta DO      XR Chest 1 View    Result Date: 9/4/2023  Impression: 1.  Minimal diffuse pulmonary opacity due to scarring, atelectasis and/or interstitial infiltrate. 2.  No dense atelectasis or consolidation.  This report was finalized on 9/4/2023 11:44 AM by Floyd Felix MD.       Results for orders placed or performed during the hospital encounter of 09/04/23   Comprehensive Metabolic Panel    Specimen: Arm, Right; Blood   Result Value Ref Range    Glucose 146 (H) 65 - 99 mg/dL    BUN 37 (H) 8 - 23 mg/dL    Creatinine 1.54 (H) 0.76 - 1.27 mg/dL    Sodium 119 (C) 136 - 145 mmol/L    Potassium 4.2 3.5 - 5.2 mmol/L    Chloride 88 (L) 98 - 107 mmol/L    CO2 13.6 (L) 22.0 - 29.0 mmol/L    Calcium 8.1 (L) 8.6 - 10.5 mg/dL    Total Protein 7.1 6.0 - 8.5 g/dL    Albumin 3.6 3.5 - 5.2 g/dL    ALT (SGPT) 83 (H) 1 - 41 U/L    AST (SGOT) 146 (H) 1 - 40 U/L    Alkaline Phosphatase 63 39 - 117 U/L    Total Bilirubin 0.9 0.0 - 1.2 mg/dL    Globulin 3.5 gm/dL    A/G Ratio 1.0 g/dL    BUN/Creatinine Ratio 24.0 7.0 - 25.0    Anion Gap 17.4 (H) 5.0 - 15.0 mmol/L    eGFR 47.6 (L) >60.0 mL/min/1.73   High Sensitivity Troponin T    Specimen: Arm, Right; Blood   Result Value Ref Range    HS Troponin T 55 (C) <15 ng/L   CBC Auto Differential    Specimen: Arm, Right; Blood   Result Value Ref Range    WBC 16.24 (H) 3.40 - 10.80 10*3/mm3    RBC 4.85 4.14 - 5.80 10*6/mm3    Hemoglobin 16.5 13.0 - 17.7 g/dL    Hematocrit 46.9 37.5 - 51.0 %    MCV 96.7 79.0 - 97.0 fL    MCH 34.0 (H) 26.6 - 33.0 pg    MCHC 35.2 31.5 - 35.7 g/dL    RDW 12.4 12.3 - 15.4 %    RDW-SD 44.7 37.0 - 54.0 fl    MPV 10.2 6.0 - 12.0 fL    Platelets 136 (L) 140 - 450 10*3/mm3   Manual Differential    Specimen: Arm, Right; Blood   Result  Value Ref Range    Neutrophil % 77.0 (H) 42.7 - 76.0 %    Lymphocyte % 12.0 (L) 19.6 - 45.3 %    Monocyte % 8.0 5.0 - 12.0 %    Bands %  3.0 0.0 - 5.0 %    Neutrophils Absolute 12.99 (H) 1.70 - 7.00 10*3/mm3    Lymphocytes Absolute 1.95 0.70 - 3.10 10*3/mm3    Monocytes Absolute 1.30 (H) 0.10 - 0.90 10*3/mm3    RBC Morphology Normal Normal    Platelet Morphology Normal Normal   ECG 12 Lead Tachycardia   Result Value Ref Range    QT Interval 280 ms    QTC Interval 499 ms   ECG 12 Lead Chest Pain   Result Value Ref Range    QT Interval 328 ms    QTC Interval 433 ms   Green Top (Gel)   Result Value Ref Range    Extra Tube Hold for add-ons.    Lavender Top   Result Value Ref Range    Extra Tube hold for add-on    Gold Top - SST   Result Value Ref Range    Extra Tube Hold for add-ons.    Light Blue Top   Result Value Ref Range    Extra Tube Hold for add-ons.                                           Medical Decision Making  CBC unremarkable.  CMP notes hyponatremia.  IV fluids given.  Repeat BMP pending.  Troponin slightly elevated.  EKG confirmed atrial fibrillation with rapid ventricular response.  Patient received Cardizem 10 mg IV x2 bolus.  Minimal improvement.  Cardizem drip initiated with Lopressor.  Heart rate improved to 109.  Repeat EKG pending.  Urinalysis pending recommend admission for further work up and treatment.  Hospitalist team consulted and made aware of the patient.  Consults and orders placed per hospitalist request.  Patient was agreeable to admission plan.  Vitals stable on admission.    Problems Addressed:  Acute kidney injury: complicated acute illness or injury  Atrial fibrillation with rapid ventricular response: complicated acute illness or injury  Hyponatremia: complicated acute illness or injury    Amount and/or Complexity of Data Reviewed  Labs: ordered. Decision-making details documented in ED Course.  Radiology: ordered. Decision-making details documented in ED Course.  ECG/medicine  tests: ordered.    Risk  OTC drugs.  Prescription drug management.  Decision regarding hospitalization.        Final diagnoses:   Atrial fibrillation with rapid ventricular response   Hyponatremia   Acute kidney injury       ED Disposition  ED Disposition       ED Disposition   Decision to Admit    Condition   --    Comment   --               No follow-up provider specified.       Medication List      No changes were made to your prescriptions during this visit.            Shilo Armenta,   09/04/23 1315       Shilo Armenta,   09/04/23 1315

## 2023-09-05 ENCOUNTER — APPOINTMENT (OUTPATIENT)
Dept: CARDIOLOGY | Facility: HOSPITAL | Age: 73
DRG: 643 | End: 2023-09-05
Payer: MEDICARE

## 2023-09-05 LAB
ANION GAP SERPL CALCULATED.3IONS-SCNC: 10.8 MMOL/L (ref 5–15)
ANION GAP SERPL CALCULATED.3IONS-SCNC: 6.9 MMOL/L (ref 5–15)
ANION GAP SERPL CALCULATED.3IONS-SCNC: 9.2 MMOL/L (ref 5–15)
ANION GAP SERPL CALCULATED.3IONS-SCNC: 9.3 MMOL/L (ref 5–15)
BH CV ECHO MEAS - ACS: 1.1 CM
BH CV ECHO MEAS - AO MAX PG: 6.3 MMHG
BH CV ECHO MEAS - AO MEAN PG: 3 MMHG
BH CV ECHO MEAS - AO ROOT DIAM: 3.1 CM
BH CV ECHO MEAS - AO V2 MAX: 125 CM/SEC
BH CV ECHO MEAS - AO V2 VTI: 19.7 CM
BH CV ECHO MEAS - EDV(CUBED): 59.3 ML
BH CV ECHO MEAS - EDV(MOD-SP4): 54.5 ML
BH CV ECHO MEAS - EF(MOD-SP4): 71.9 %
BH CV ECHO MEAS - ESV(CUBED): 24.4 ML
BH CV ECHO MEAS - ESV(MOD-SP4): 15.3 ML
BH CV ECHO MEAS - FS: 25.6 %
BH CV ECHO MEAS - IVS/LVPW: 0.86 CM
BH CV ECHO MEAS - IVSD: 1.2 CM
BH CV ECHO MEAS - LA DIMENSION: 3.8 CM
BH CV ECHO MEAS - LAT PEAK E' VEL: 15.4 CM/SEC
BH CV ECHO MEAS - LV DIASTOLIC VOL/BSA (35-75): 26.1 CM2
BH CV ECHO MEAS - LV MASS(C)D: 179.7 GRAMS
BH CV ECHO MEAS - LV SYSTOLIC VOL/BSA (12-30): 7.3 CM2
BH CV ECHO MEAS - LVIDD: 3.9 CM
BH CV ECHO MEAS - LVIDS: 2.9 CM
BH CV ECHO MEAS - LVOT AREA: 2.8 CM2
BH CV ECHO MEAS - LVOT DIAM: 1.9 CM
BH CV ECHO MEAS - LVPWD: 1.4 CM
BH CV ECHO MEAS - MED PEAK E' VEL: 10.4 CM/SEC
BH CV ECHO MEAS - MV E MAX VEL: 67.7 CM/SEC
BH CV ECHO MEAS - PA ACC TIME: 0.07 SEC
BH CV ECHO MEAS - RAP SYSTOLE: 10 MMHG
BH CV ECHO MEAS - RVSP: 33.8 MMHG
BH CV ECHO MEAS - SI(MOD-SP4): 18.8 ML/M2
BH CV ECHO MEAS - SV(MOD-SP4): 39.2 ML
BH CV ECHO MEAS - TAPSE (>1.6): 1.12 CM
BH CV ECHO MEAS - TR MAX PG: 23.8 MMHG
BH CV ECHO MEAS - TR MAX VEL: 244 CM/SEC
BH CV ECHO MEASUREMENTS AVERAGE E/E' RATIO: 5.25
BUN SERPL-MCNC: 18 MG/DL (ref 8–23)
BUN SERPL-MCNC: 20 MG/DL (ref 8–23)
BUN SERPL-MCNC: 22 MG/DL (ref 8–23)
BUN SERPL-MCNC: 24 MG/DL (ref 8–23)
BUN/CREAT SERPL: 23.5 (ref 7–25)
BUN/CREAT SERPL: 23.7 (ref 7–25)
BUN/CREAT SERPL: 25 (ref 7–25)
BUN/CREAT SERPL: 26.4 (ref 7–25)
CALCIUM SPEC-SCNC: 6.7 MG/DL (ref 8.6–10.5)
CALCIUM SPEC-SCNC: 6.7 MG/DL (ref 8.6–10.5)
CALCIUM SPEC-SCNC: 6.8 MG/DL (ref 8.6–10.5)
CALCIUM SPEC-SCNC: 6.8 MG/DL (ref 8.6–10.5)
CHLORIDE SERPL-SCNC: 92 MMOL/L (ref 98–107)
CHLORIDE SERPL-SCNC: 93 MMOL/L (ref 98–107)
CHLORIDE SERPL-SCNC: 94 MMOL/L (ref 98–107)
CHLORIDE SERPL-SCNC: 96 MMOL/L (ref 98–107)
CK SERPL-CCNC: 1067 U/L (ref 20–200)
CO2 SERPL-SCNC: 17.1 MMOL/L (ref 22–29)
CO2 SERPL-SCNC: 17.2 MMOL/L (ref 22–29)
CO2 SERPL-SCNC: 17.7 MMOL/L (ref 22–29)
CO2 SERPL-SCNC: 18.8 MMOL/L (ref 22–29)
CREAT SERPL-MCNC: 0.76 MG/DL (ref 0.76–1.27)
CREAT SERPL-MCNC: 0.85 MG/DL (ref 0.76–1.27)
CREAT SERPL-MCNC: 0.88 MG/DL (ref 0.76–1.27)
CREAT SERPL-MCNC: 0.91 MG/DL (ref 0.76–1.27)
EGFRCR SERPLBLD CKD-EPI 2021: 89.5 ML/MIN/1.73
EGFRCR SERPLBLD CKD-EPI 2021: 91.4 ML/MIN/1.73
EGFRCR SERPLBLD CKD-EPI 2021: 92.3 ML/MIN/1.73
EGFRCR SERPLBLD CKD-EPI 2021: 95.5 ML/MIN/1.73
GLUCOSE BLDC GLUCOMTR-MCNC: 105 MG/DL (ref 70–130)
GLUCOSE BLDC GLUCOMTR-MCNC: 117 MG/DL (ref 70–130)
GLUCOSE SERPL-MCNC: 100 MG/DL (ref 65–99)
GLUCOSE SERPL-MCNC: 115 MG/DL (ref 65–99)
GLUCOSE SERPL-MCNC: 96 MG/DL (ref 65–99)
GLUCOSE SERPL-MCNC: 99 MG/DL (ref 65–99)
LEFT ATRIUM VOLUME INDEX: 17.1 ML/M2
OSMOLALITY SERPL: 261 MOSM/KG (ref 280–301)
OSMOLALITY UR: 644 MOSM/KG
POTASSIUM SERPL-SCNC: 3.8 MMOL/L (ref 3.5–5.2)
POTASSIUM SERPL-SCNC: 3.9 MMOL/L (ref 3.5–5.2)
POTASSIUM SERPL-SCNC: 3.9 MMOL/L (ref 3.5–5.2)
POTASSIUM SERPL-SCNC: 4 MMOL/L (ref 3.5–5.2)
SODIUM SERPL-SCNC: 120 MMOL/L (ref 136–145)
SODIUM SERPL-SCNC: 120 MMOL/L (ref 136–145)
SODIUM SERPL-SCNC: 121 MMOL/L (ref 136–145)
SODIUM SERPL-SCNC: 121 MMOL/L (ref 136–145)
SODIUM UR-SCNC: 31 MMOL/L
TRIGL SERPL-MCNC: 217 MG/DL (ref 0–150)

## 2023-09-05 PROCEDURE — 80048 BASIC METABOLIC PNL TOTAL CA: CPT | Performed by: INTERNAL MEDICINE

## 2023-09-05 PROCEDURE — 25010000002 CALCIUM GLUCONATE-NACL 1-0.675 GM/50ML-% SOLUTION: Performed by: INTERNAL MEDICINE

## 2023-09-05 PROCEDURE — 93306 TTE W/DOPPLER COMPLETE: CPT | Performed by: INTERNAL MEDICINE

## 2023-09-05 PROCEDURE — 97166 OT EVAL MOD COMPLEX 45 MIN: CPT

## 2023-09-05 PROCEDURE — 83930 ASSAY OF BLOOD OSMOLALITY: CPT | Performed by: INTERNAL MEDICINE

## 2023-09-05 PROCEDURE — 82948 REAGENT STRIP/BLOOD GLUCOSE: CPT

## 2023-09-05 PROCEDURE — 94660 CPAP INITIATION&MGMT: CPT

## 2023-09-05 PROCEDURE — 97163 PT EVAL HIGH COMPLEX 45 MIN: CPT

## 2023-09-05 PROCEDURE — 94761 N-INVAS EAR/PLS OXIMETRY MLT: CPT

## 2023-09-05 PROCEDURE — 25010000002 THIAMINE PER 100 MG: Performed by: INTERNAL MEDICINE

## 2023-09-05 PROCEDURE — 94799 UNLISTED PULMONARY SVC/PX: CPT

## 2023-09-05 PROCEDURE — 93005 ELECTROCARDIOGRAM TRACING: CPT | Performed by: INTERNAL MEDICINE

## 2023-09-05 PROCEDURE — 93306 TTE W/DOPPLER COMPLETE: CPT

## 2023-09-05 PROCEDURE — 82550 ASSAY OF CK (CPK): CPT | Performed by: INTERNAL MEDICINE

## 2023-09-05 PROCEDURE — 84478 ASSAY OF TRIGLYCERIDES: CPT | Performed by: INTERNAL MEDICINE

## 2023-09-05 PROCEDURE — 84300 ASSAY OF URINE SODIUM: CPT | Performed by: INTERNAL MEDICINE

## 2023-09-05 PROCEDURE — 83935 ASSAY OF URINE OSMOLALITY: CPT | Performed by: INTERNAL MEDICINE

## 2023-09-05 RX ORDER — LOSARTAN POTASSIUM 50 MG/1
100 TABLET ORAL
Status: CANCELLED | OUTPATIENT
Start: 2023-09-05

## 2023-09-05 RX ORDER — INSULIN LISPRO 100 [IU]/ML
2-7 INJECTION, SOLUTION INTRAVENOUS; SUBCUTANEOUS
Status: DISCONTINUED | OUTPATIENT
Start: 2023-09-05 | End: 2023-09-07 | Stop reason: HOSPADM

## 2023-09-05 RX ORDER — LEVOTHYROXINE SODIUM 0.07 MG/1
75 TABLET ORAL DAILY
Status: CANCELLED | OUTPATIENT
Start: 2023-09-05

## 2023-09-05 RX ORDER — PANTOPRAZOLE SODIUM 40 MG/1
40 TABLET, DELAYED RELEASE ORAL
Status: DISCONTINUED | OUTPATIENT
Start: 2023-09-05 | End: 2023-09-07 | Stop reason: HOSPADM

## 2023-09-05 RX ORDER — ATORVASTATIN CALCIUM 20 MG/1
20 TABLET, FILM COATED ORAL NIGHTLY
Status: CANCELLED | OUTPATIENT
Start: 2023-09-05

## 2023-09-05 RX ORDER — ASPIRIN 81 MG/1
81 TABLET ORAL DAILY
Status: CANCELLED | OUTPATIENT
Start: 2023-09-05

## 2023-09-05 RX ORDER — ZOLPIDEM TARTRATE 5 MG/1
10 TABLET ORAL NIGHTLY
Status: CANCELLED | OUTPATIENT
Start: 2023-09-05

## 2023-09-05 RX ORDER — SULFASALAZINE 500 MG/1
500 TABLET ORAL 2 TIMES DAILY
Status: DISCONTINUED | OUTPATIENT
Start: 2023-09-05 | End: 2023-09-07 | Stop reason: HOSPADM

## 2023-09-05 RX ORDER — GLUCAGON 1 MG/ML
1 KIT INJECTION
Status: DISCONTINUED | OUTPATIENT
Start: 2023-09-05 | End: 2023-09-07 | Stop reason: HOSPADM

## 2023-09-05 RX ORDER — SULFASALAZINE 500 MG/1
500 TABLET ORAL 2 TIMES DAILY
COMMUNITY

## 2023-09-05 RX ORDER — FOLIC ACID 1 MG/1
1000 TABLET ORAL DAILY
Status: DISCONTINUED | OUTPATIENT
Start: 2023-09-05 | End: 2023-09-07 | Stop reason: HOSPADM

## 2023-09-05 RX ORDER — PANTOPRAZOLE SODIUM 40 MG/1
40 TABLET, DELAYED RELEASE ORAL DAILY
Status: CANCELLED | OUTPATIENT
Start: 2023-09-05

## 2023-09-05 RX ORDER — FOLIC ACID 1 MG/1
1 TABLET ORAL DAILY
COMMUNITY
Start: 2023-06-08

## 2023-09-05 RX ORDER — LEVOTHYROXINE SODIUM 0.07 MG/1
75 TABLET ORAL
Status: DISCONTINUED | OUTPATIENT
Start: 2023-09-05 | End: 2023-09-07 | Stop reason: HOSPADM

## 2023-09-05 RX ORDER — ASPIRIN 81 MG/1
81 TABLET, CHEWABLE ORAL DAILY
Status: DISCONTINUED | OUTPATIENT
Start: 2023-09-05 | End: 2023-09-07 | Stop reason: HOSPADM

## 2023-09-05 RX ORDER — ATORVASTATIN CALCIUM 20 MG/1
20 TABLET, FILM COATED ORAL NIGHTLY
Status: DISCONTINUED | OUTPATIENT
Start: 2023-09-05 | End: 2023-09-05

## 2023-09-05 RX ORDER — NICOTINE POLACRILEX 4 MG
15 LOZENGE BUCCAL
Status: DISCONTINUED | OUTPATIENT
Start: 2023-09-05 | End: 2023-09-07 | Stop reason: HOSPADM

## 2023-09-05 RX ORDER — METOPROLOL SUCCINATE 50 MG/1
100 TABLET, EXTENDED RELEASE ORAL EVERY MORNING
Status: CANCELLED | OUTPATIENT
Start: 2023-09-05

## 2023-09-05 RX ORDER — HYDROCHLOROTHIAZIDE 25 MG/1
12.5 TABLET ORAL
Status: CANCELLED | OUTPATIENT
Start: 2023-09-05

## 2023-09-05 RX ORDER — DEXTROSE MONOHYDRATE 25 G/50ML
25 INJECTION, SOLUTION INTRAVENOUS
Status: DISCONTINUED | OUTPATIENT
Start: 2023-09-05 | End: 2023-09-07 | Stop reason: HOSPADM

## 2023-09-05 RX ORDER — 3% SODIUM CHLORIDE 3 G/100ML
50 INJECTION, SOLUTION INTRAVENOUS CONTINUOUS
Status: DISPENSED | OUTPATIENT
Start: 2023-09-05 | End: 2023-09-05

## 2023-09-05 RX ORDER — SULFASALAZINE 500 MG/1
500 TABLET ORAL 2 TIMES DAILY
Status: CANCELLED | OUTPATIENT
Start: 2023-09-05

## 2023-09-05 RX ORDER — CALCIUM GLUCONATE 20 MG/ML
1000 INJECTION, SOLUTION INTRAVENOUS ONCE
Status: COMPLETED | OUTPATIENT
Start: 2023-09-05 | End: 2023-09-05

## 2023-09-05 RX ORDER — 3% SODIUM CHLORIDE 3 G/100ML
50 INJECTION, SOLUTION INTRAVENOUS ONCE
Status: COMPLETED | OUTPATIENT
Start: 2023-09-05 | End: 2023-09-05

## 2023-09-05 RX ADMIN — PANTOPRAZOLE SODIUM 40 MG: 40 TABLET, DELAYED RELEASE ORAL at 12:26

## 2023-09-05 RX ADMIN — SODIUM CHLORIDE 50 ML: 3 INJECTION, SOLUTION INTRAVENOUS at 05:59

## 2023-09-05 RX ADMIN — METOPROLOL TARTRATE 50 MG: 50 TABLET, FILM COATED ORAL at 09:38

## 2023-09-05 RX ADMIN — SULFASALAZINE 500 MG: 500 TABLET ORAL at 12:26

## 2023-09-05 RX ADMIN — CALCIUM GLUCONATE 1000 MG: 20 INJECTION, SOLUTION INTRAVENOUS at 15:56

## 2023-09-05 RX ADMIN — ASPIRIN 81 MG: 81 TABLET, CHEWABLE ORAL at 12:26

## 2023-09-05 RX ADMIN — SULFASALAZINE 500 MG: 500 TABLET ORAL at 20:35

## 2023-09-05 RX ADMIN — SERTRALINE 100 MG: 50 TABLET, FILM COATED ORAL at 12:25

## 2023-09-05 RX ADMIN — Medication 10 ML: at 09:38

## 2023-09-05 RX ADMIN — ZOLPIDEM TARTRATE 5 MG: 5 TABLET ORAL at 20:35

## 2023-09-05 RX ADMIN — LEVOTHYROXINE SODIUM 75 MCG: 0.07 TABLET ORAL at 12:26

## 2023-09-05 RX ADMIN — THIAMINE HYDROCHLORIDE 200 MG: 100 INJECTION, SOLUTION INTRAMUSCULAR; INTRAVENOUS at 00:49

## 2023-09-05 RX ADMIN — SODIUM CHLORIDE 5 MG/HR: 900 INJECTION, SOLUTION INTRAVENOUS at 20:37

## 2023-09-05 RX ADMIN — Medication 10 ML: at 20:36

## 2023-09-05 RX ADMIN — DOCUSATE SODIUM 50 MG AND SENNOSIDES 8.6 MG 2 TABLET: 8.6; 5 TABLET, FILM COATED ORAL at 09:39

## 2023-09-05 RX ADMIN — SODIUM CHLORIDE 50 ML: 3 INJECTION, SOLUTION INTRAVENOUS at 03:33

## 2023-09-05 RX ADMIN — METOPROLOL TARTRATE 75 MG: 50 TABLET, FILM COATED ORAL at 20:35

## 2023-09-05 RX ADMIN — SODIUM CHLORIDE 50 ML: 3 INJECTION, SOLUTION INTRAVENOUS at 00:44

## 2023-09-05 RX ADMIN — SODIUM CHLORIDE 5 MG/HR: 900 INJECTION, SOLUTION INTRAVENOUS at 03:54

## 2023-09-05 RX ADMIN — APIXABAN 5 MG: 5 TABLET, FILM COATED ORAL at 09:38

## 2023-09-05 RX ADMIN — Medication 1000 MCG: at 12:26

## 2023-09-05 RX ADMIN — DOCUSATE SODIUM 50 MG AND SENNOSIDES 8.6 MG 2 TABLET: 8.6; 5 TABLET, FILM COATED ORAL at 20:35

## 2023-09-05 RX ADMIN — SODIUM CHLORIDE 50 ML/HR: 3 INJECTION, SOLUTION INTRAVENOUS at 17:29

## 2023-09-05 RX ADMIN — APIXABAN 5 MG: 5 TABLET, FILM COATED ORAL at 20:35

## 2023-09-05 NOTE — PLAN OF CARE
Goal Outcome Evaluation:  Plan of Care Reviewed With: patient        Progress: no change  Outcome Evaluation: Pt remains A+Ox4 with intermittent confusion. VSS, afebrile, on 2L via NC due to labored breathing/tachypneic. Cardizem gtt infusing. Na+ came back 119, pt had two doses of sodium chloride 3%, 50mL each per Dr. Tate. Pt put on 1L fluid restriction. Bed in lowest position, alarm on. Call light in reach. Pt resting in bed and denies any requests at this time.

## 2023-09-05 NOTE — CASE MANAGEMENT/SOCIAL WORK
Discharge Planning Assessment   [place]     Patient Name: Clinton Hernandez  MRN: [id number]  Today's Date: 9/5/2023    Admit Date: 9/4/2023    Plan: SS received nursing consult for bpa d/c needs. SS spoke with pt at bedside on this date. Pt lives with spouse Edith and plans to return back home at discharge. Pt does not utilzie HH services at this time. Pt PCP Caroline Fernando. Pt states he has a cpap but does not utilize his cpap. Pt states he has POA and living will, but neither is on file. Pt states spouse would provide transportation at dischrage.   Discharge Needs Assessment       Row Name 09/05/23 1526       Living Environment    People in Home spouse    Name(s) of People in Home spouse Edith    Current Living Arrangements home    Potentially Unsafe Housing Conditions none    Primary Care Provided by self    Provides Primary Care For no one    Family Caregiver if Needed child([place]), adult;spouse    Quality of Family Relationships involved;helpful;supportive    Able to Return to Prior Arrangements yes       Resource/Environmental Concerns    Resource/Environmental Concerns none       Food Insecurity    Within the past 12 months, you worried that your food would run out before you got the money to buy more. Never true    Within the past 12 months, the food you bought just didn't last and you didn't have money to get more. Never true       Transition Planning    Patient/Family Anticipates Transition to home with family    Patient/Family Anticipated Services at Transition none       Discharge Needs Assessment    Equipment Currently Used at Home cpap    Concerns to be Addressed --  bpa d/c needs                Discharge Plan       Row Name 09/05/23 1527       Plan    Plan SS received nursing consult for bpa d/c needs. SS spoke with pt at bedside on this date. Pt lives with spouse Edith and plans to return back home at discharge. Pt does not utilzie HH services at this time. Pt PCP Caroline Fernando. Pt states he has a cpap but  does not utilize his cpap. Pt states he has POA and living will, but neither is on file. Pt states spouse would provide transportation at dischrage.          Expected Discharge Date and Time       Expected Discharge Date Expected Discharge Time    Sep 6, 2023            Demographic Summary       Row Name 09/05/23 1525       General Information    Admission Type inpatient    Arrived From home    Referral Source nursing    Reason for Consult --  bpa d/c needs    Preferred Language English               JUAQUIN Watson

## 2023-09-05 NOTE — PROGRESS NOTES
Patient on HCTZ . Sodium did not keep improving with saline. Fell to 119 after continuing the same. Given two rounds of 50 ml 3%. Kaci to 121. Then realized he drank a liter of water thru night. Dr Sandoval restricted fluid and I have ordered yet one more round of 50 ml 3%. Dr Edgar to do consult later today.

## 2023-09-05 NOTE — NURSING NOTE
Called the pharmacist to make them aware that it was flagging me when I put the sodium chloride 3% into the pump as a infusion. Pharmacist told me to put it under a bolus.Verified the rate and dose as well while on the phone with her. LISA Jason was a witness. Medication is infusing currently.

## 2023-09-05 NOTE — THERAPY EVALUATION
Acute Care - Physical Therapy Initial Evaluation   [place]     Patient Name: Clinton Hernandez  : 1950  MRN: [id number]  Today's Date: 2023   Onset of Illness/Injury or Date of Surgery: 23  Visit Dx:     ICD-10-CM ICD-9-CM   1. Atrial fibrillation with rapid ventricular response  I48.91 427.31   2. Hyponatremia  E87.1 276.1   3. Acute kidney injury  N17.9 584.9     Patient Active Problem List   Diagnosis    Paroxysmal atrial fibrillation    Chronic anticoagulation,eliquis    Mixed hyperlipidemia    Hypothyroidism (acquired)    Essential hypertension    Chest pain in adult    Ventricular tachycardia, non-sustained    Hyponatremia     Past Medical History:   Diagnosis Date    Abnormal ECG     Arrhythmia     Atrial fibrillation     Bowel trouble     Diabetes mellitus     Hyperlipidemia     Hypertension     Hypothyroid     Rheumatoid aortitis      Past Surgical History:   Procedure Laterality Date    CARDIAC ABLATION      CATHETER ATRIAL SUPRAVENTRICULAR TACHYCARDIA    CARDIAC CATHETERIZATION      CARPAL TUNNEL RELEASE      COLECTOMY PARTIAL / TOTAL      HERNIA REPAIR      KNEE ARTHROSCOPY      BOTH KNEES     PT Assessment (last 12 hours)       PT Evaluation and Treatment       Row Name 23 09          Physical Therapy Time and Intention    Subjective Information no complaints  -CS     Document Type evaluation  -CS     Mode of Treatment physical therapy  -CS     Patient Effort adequate  -CS     Comment Pt seen bedside this AM. Pt was (I) w/ mobility prior to admission. Pt agreed to evaluation.  -CS       Row Name 23 09          General Information    Patient Profile Reviewed yes  -CS     Onset of Illness/Injury or Date of Surgery 23  -CS     Referring Physician Bhavin  -CS     Patient Observations alert;cooperative;agree to therapy  -CS     General Observations of Patient Pt supine, all PCU lines intact  -CS     Risks Reviewed patient:;LOB;dizziness;increased  discomfort;change in vital signs;lines disloged  -     Benefits Reviewed patient:;improve function;increase independence;increase strength;increase balance;decrease pain;decrease risk of DVT;improve skin integrity;increase knowledge  -       Row Name 09/05/23 0900          Pain    Additional Documentation --  no c/o  -CS       Row Name 09/05/23 0900          Cognition    Orientation Status (Cognition) oriented x 4  -       Row Name 09/05/23 0900          Range of Motion (ROM)    Range of Motion --  (B)LE WFL  -       Row Name 09/05/23 0900          Strength Comprehensive (MMT)    Comment, General Manual Muscle Testing (MMT) Assessment (B)LE grossly 4-/5  -       Row Name 09/05/23 0900          Bed Mobility    Bed Mobility bed mobility (all) activities  -CS     All Activities, [place] (Bed Mobility) minimum assist (75% patient effort);moderate assist (50% patient effort)  -       Row Name 09/05/23 0900          Transfers    Comment, (Transfers) Deferred 2/2 tachycardia  -       Row Name 09/05/23 0900          Gait/Stairs (Locomotion)    Patient was able to Ambulate no, other medical factors prevent ambulation  -CS     Reason Patient was unable to Ambulate Other (Comment)  Deferred 2/2 tachycardia  -       Row Name 09/05/23 0900          Plan of Care Review    Plan of Care Reviewed With patient  -CS     Progress no change  -     Outcome Evaluation Pt presents w/ decreasd bed mobility, transfers, and gait. Pt would benefit from skilled PT while in house. D/C disposition depends on progress while in house.  -       Row Name 09/05/23 0900          Positioning and Restraints    Pre-Treatment Position in bed  -CS     Post Treatment Position bed  -CS     In Bed supine;call light within reach;encouraged to call for assist;exit alarm on  -       Row Name 09/05/23 0900          Therapy Assessment/Plan (PT)    Functional Level at Time of Evaluation (PT) Min(A)  -CS     PT Diagnosis (PT) impaired  functional mobility  -CS     Rehab Potential (PT) good, to achieve stated therapy goals  -CS     Criteria for Skilled Interventions Met (PT) yes;meets criteria;skilled treatment is necessary  -CS     Therapy Frequency (PT) 2 times/wk  2-5x/week  -CS     Predicted Duration of Therapy Intervention (PT) while in house  -CS     Problem List (PT) problems related to;balance;mobility;strength  -CS     Activity Limitations Related to Problem List (PT) unable to ambulate safely;unable to transfer safely  -CS     Comment, Therapy Assessment/Plan (PT) Pt presents w/ decreasd bed mobility, transfers, and gait. Pt would benefit from skilled PT while in house. D/C disposition depends on progress while in house.  -CS       Row Name 09/05/23 0900          PT Evaluation Complexity    History, PT Evaluation Complexity 3 or more personal factors and/or comorbidities  -CS     Examination of Body Systems (PT Eval Complexity) total of 4 or more elements  -CS     Clinical Presentation (PT Evaluation Complexity) unstable  -CS     Clinical Decision Making (PT Evaluation Complexity) high complexity  -CS     Overall Complexity (PT Evaluation Complexity) high complexity  -CS       Row Name 09/05/23 0900          Therapy Plan Review/Discharge Plan (PT)    Therapy Plan Review (PT) evaluation/treatment results reviewed;care plan/treatment goals reviewed;risks/benefits reviewed;current/potential barriers reviewed;participants voiced agreement with care plan;participants included;patient  -CS       Row Name 09/05/23 0900          Physical Therapy Goals    Bed Mobility Goal Selection (PT) bed mobility, PT goal 1  -CS     Transfer Goal Selection (PT) transfer, PT goal 1  -CS     Gait Training Goal Selection (PT) gait training, PT goal 1  -CS       Row Name 09/05/23 0900          Bed Mobility Goal 1 (PT)    Activity/Assistive Device (Bed Mobility Goal 1, PT) bed mobility activities, all  -CS     [place] Level/Cues Needed (Bed Mobility Goal 1,  PT) standby assist  -CS     Time Frame (Bed Mobility Goal 1, PT) long term goal (LTG);by discharge  -       Row Name 09/05/23 0900          Transfer Goal 1 (PT)    Activity/Assistive Device (Transfer Goal 1, PT) transfers, all  -CS     [place] Level/Cues Needed (Transfer Goal 1, PT) standby assist  -CS     Time Frame (Transfer Goal 1, PT) long term goal (LTG);by discharge  -       Row Name 09/05/23 0900          Gait Training Goal 1 (PT)    Activity/Assistive Device (Gait Training Goal 1, PT) gait (walking locomotion)  -CS     [place] Level (Gait Training Goal 1, PT) standby assist  -CS     Distance (Gait Training Goal 1, PT) 100  -CS     Time Frame (Gait Training Goal 1, PT) long term goal (LTG);by discharge  -               User Key  (r) = Recorded By, (t) = Taken By, (c) = Cosigned By      Initials Name Provider Type     Elie Sommers, PT Physical Therapist                    Physical Therapy Education       Title: PT OT SLP Therapies (Done)       Topic: Physical Therapy (Done)       Point: Mobility training (Done)       Learning Progress Summary             Patient Acceptance, E,TB, VU by  at 9/5/2023 0927                         Point: Home exercise program (Done)       Learning Progress Summary             Patient Acceptance, E,TB, VU by  at 9/5/2023 0927                         Point: Body mechanics (Done)       Learning Progress Summary             Patient Acceptance, E,TB, VU by  at 9/5/2023 0927                         Point: Precautions (Done)       Learning Progress Summary             Patient Acceptance, E,TB, VU by  at 9/5/2023 0927                                         User Key       Initials Effective Dates Name Provider Type [facility] 05/31/23 -  Elie Sommers, PT Physical Therapist PT                  PT Recommendation and Plan  Anticipated Discharge Disposition (PT):  (TBD)  Planned Therapy Interventions (PT): balance training, bed mobility training, gait  training, home exercise program, neuromuscular re-education, patient/family education, strengthening, transfer training  Therapy Frequency (PT): 2 times/wk (2-5x/week)  Plan of Care Reviewed With: patient  Progress: no change  Outcome Evaluation: Pt presents w/ decreasd bed mobility, transfers, and gait. Pt would benefit from skilled PT while in house. D/C disposition depends on progress while in house.       Time Calculation:    PT Charges       Row Name 09/05/23 0928             Time Calculation    Start Time 0815  -CS      PT Received On 09/05/23  -      PT Goal Re-Cert Due Date 09/19/23  -                User Key  (r) = Recorded By, (t) = Taken By, (c) = Cosigned By      Initials Name Provider Type     Elie Sommers, PT Physical Therapist                  Therapy Charges for Today       Code Description Service Date Service Provider Modifiers Qty    [id number] HC PT EVAL HIGH COMPLEXITY 4 9/5/2023 Elie Sommers, PT GP 1            PT G-Codes  AM-PAC 6 Clicks Score (PT): 13    Elie Sommers PT  9/5/2023

## 2023-09-05 NOTE — CONSULTS
Nephrology Consult Note    Referring Provider: Dr. Zheng  Reason for Consultation: Low sodium    Subjective       History of present illness:  Clinton Hernandez is a 72 y.o. male who presented to [facility] emergency department with chief complaint of not feeling well, shortness of breath and generalized weakness.  Patient is known to have atrial fibrillation, hypothyroidism, essential hypertension, and type 2 diabetes mellitus.  Patient stated he has not been feeling well in the past few days at home and has had occasional nausea.  Patient stated he has been drinking a lot of liquids only.  On arrival to the hospital patient had a sodium of 119 he had to be transfused 3% of saline because the sodium did not improve on fluid restriction alone.  Patient also has been taking hydrochlorothiazide for his blood pressure at home  Patient denied chronic NSAIDS use. Patient denies hematuria, dysuria, difficulty passing urine. No prior history of renal stones. No family history of renal disease    History  Past Medical History:   Diagnosis Date    Abnormal ECG     Arrhythmia     Atrial fibrillation     Bowel trouble     Diabetes mellitus     Hyperlipidemia     Hypertension     Hypothyroid     Rheumatoid aortitis    ,   Past Surgical History:   Procedure Laterality Date    CARDIAC ABLATION      CATHETER ATRIAL SUPRAVENTRICULAR TACHYCARDIA    CARDIAC CATHETERIZATION      CARPAL TUNNEL RELEASE      COLECTOMY PARTIAL / TOTAL      HERNIA REPAIR      KNEE ARTHROSCOPY      BOTH KNEES   ,   Family History   Problem Relation Age of Onset    Cancer Mother     Diabetes Father     Heart disease Father     Heart attack Father     Diabetes Other     Heart disease Other    ,   Social History     Tobacco Use    Smoking status: Former     Packs/day: 0.50     Years: 2.00     Pack years: 1.00     Types: Cigarettes     Start date: 1975     Quit date: 1978     Years since quittin.7    Smokeless tobacco: Never    Vaping Use    Vaping Use: Never used   Substance Use Topics    Alcohol use: Not Currently     Comment: occasional    Drug use: Never   ,   Medications Prior to Admission   Medication Sig Dispense Refill Last Dose    aspirin 81 MG tablet Take  by mouth.       atorvastatin (LIPITOR) 20 MG tablet Take 20 mg by mouth Every Night.       azelastine (ASTELIN) 0.1 % nasal spray        coenzyme Q10 100 MG capsule Take 200 mg by mouth Daily.       Eliquis 5 MG tablet tablet Take 1 tablet by mouth Every 12 (Twelve) Hours. 28 tablet 0     levothyroxine (SYNTHROID, LEVOTHROID) 75 MCG tablet Take 75 mcg by mouth Daily.       losartan-hydrochlorothiazide (HYZAAR) 100-12.5 MG per tablet Take  by mouth Daily.       metFORMIN ER (GLUCOPHAGE-XR) 500 MG 24 hr tablet Take 500 mg by mouth 2 (Two) Times a Day.       metoprolol succinate XL (TOPROL-XL) 100 MG 24 hr tablet Take 100 mg by mouth Every Morning.       multivitamin with minerals tablet tablet Take 1 tablet by mouth Daily.       pantoprazole (PROTONIX) 40 MG EC tablet Take 40 mg by mouth Daily.       sertraline (ZOLOFT) 100 MG tablet Take  by mouth.       Upadacitinib ER (Rinvoq) 15 MG tablet sustained-release 24 hour Take 15 mg by mouth Daily. rheumatology       zolpidem (AMBIEN) 10 MG tablet Take 10 mg by mouth Every Night.      , Scheduled Meds:  apixaban, 5 mg, Oral, Q12H  metoprolol tartrate, 50 mg, Oral, Q12H  senna-docusate sodium, 2 tablet, Oral, BID  sodium chloride, 10 mL, Intravenous, Q12H    , Continuous Infusions:  dilTIAZem, 5-15 mg/hr, Last Rate: 5 mg/hr (09/05/23 0354)    , PRN Meds:    senna-docusate sodium **AND** polyethylene glycol **AND** bisacodyl **AND** bisacodyl    Calcium Replacement - Follow Nurse / BPA Driven Protocol    hydrOXYzine    Magnesium Standard Dose Replacement - Follow Nurse / BPA Driven Protocol    nitroglycerin    Phosphorus Replacement - Follow Nurse / BPA Driven Protocol    Potassium Replacement - Follow Nurse / BPA Driven Protocol     sodium chloride    sodium chloride    sodium chloride    zolpidem and Allergies:  Patient has no known allergies.    Review of Systems  More than 10 point review of systems was done. Pertinent items are noted in HPI, all other systems reviewed and negative    Objective     Vital Signs  Temp:  [97.4 °F (36.3 °C)-99.3 °F (37.4 °C)] 98.5 °F (36.9 °C)  Heart Rate:  [] 90  Resp:  [12-34] 28  BP: (101-150)/() 121/56    Intake/Output                   09/04/23 0701 - 09/05/23 0700     5211-1020 3917-9302 Total              Intake    P.O.  --  1000 1000    I.V.  702.3  932.7 1634.9    IV Piggyback  --  100 100    Total Intake 702.3 2032.7 2734.9       Output    Urine  175  850 1025    Total Output              Physical Examination:  General Appearance: not in acute distress  No JVD  Lungs: Clear to auscultation, respirations regular and unlabored  Heart: Regular rhythm & normal rate, normal S1, S2, no murmur, no gallop, no rub   Abdomen: Normal bowel sounds, no masses and soft non-tender  Extremities: No edema  Neurologic: Orientated to person, place, time, grossly no focal deficitis    Laboratory Data :      WBC WBC   Date Value Ref Range Status   09/04/2023 13.02 (H) 3.40 - 10.80 10*3/mm3 Final   09/04/2023 16.24 (H) 3.40 - 10.80 10*3/mm3 Final      HGB Hemoglobin   Date Value Ref Range Status   09/04/2023 13.7 13.0 - 17.7 g/dL Final   09/04/2023 16.5 13.0 - 17.7 g/dL Final      HCT Hematocrit   Date Value Ref Range Status   09/04/2023 40.4 37.5 - 51.0 % Final   09/04/2023 46.9 37.5 - 51.0 % Final      Platlets No results found for: LABPLAT   MCV MCV   Date Value Ref Range Status   09/04/2023 98.5 (H) 79.0 - 97.0 fL Final   09/04/2023 96.7 79.0 - 97.0 fL Final          Sodium Sodium   Date Value Ref Range Status   09/05/2023 121 (L) 136 - 145 mmol/L Final   09/05/2023 121 (L) 136 - 145 mmol/L Final   09/05/2023 120 (L) 136 - 145 mmol/L Final   09/04/2023 119 (C) 136 - 145 mmol/L Final   09/04/2023  120 (L) 136 - 145 mmol/L Final   09/04/2023 120 (L) 136 - 145 mmol/L Final   09/04/2023 119 (C) 136 - 145 mmol/L Final      Potassium Potassium   Date Value Ref Range Status   09/05/2023 3.8 3.5 - 5.2 mmol/L Final     Comment:     Slight hemolysis detected by analyzer. Results may be affected.   09/05/2023 3.9 3.5 - 5.2 mmol/L Final     Comment:     Slight hemolysis detected by analyzer. Results may be affected.   09/05/2023 3.9 3.5 - 5.2 mmol/L Final     Comment:     Slight hemolysis detected by analyzer. Results may be affected.   09/04/2023 3.5 3.5 - 5.2 mmol/L Final     Comment:     Slight hemolysis detected by analyzer. Results may be affected.   09/04/2023 4.6 3.5 - 5.2 mmol/L Final     Comment:     1+ Specimen hemolyzed.  Results may be affected.   09/04/2023 4.1 3.5 - 5.2 mmol/L Final   09/04/2023 4.2 3.5 - 5.2 mmol/L Final      Chloride Chloride   Date Value Ref Range Status   09/05/2023 94 (L) 98 - 107 mmol/L Final   09/05/2023 93 (L) 98 - 107 mmol/L Final   09/05/2023 92 (L) 98 - 107 mmol/L Final   09/04/2023 92 (L) 98 - 107 mmol/L Final   09/04/2023 94 (L) 98 - 107 mmol/L Final   09/04/2023 92 (L) 98 - 107 mmol/L Final   09/04/2023 88 (L) 98 - 107 mmol/L Final      CO2 CO2   Date Value Ref Range Status   09/05/2023 17.7 (L) 22.0 - 29.0 mmol/L Final   09/05/2023 18.8 (L) 22.0 - 29.0 mmol/L Final   09/05/2023 17.2 (L) 22.0 - 29.0 mmol/L Final   09/04/2023 16.4 (L) 22.0 - 29.0 mmol/L Final   09/04/2023 12.3 (L) 22.0 - 29.0 mmol/L Final   09/04/2023 13.6 (L) 22.0 - 29.0 mmol/L Final   09/04/2023 13.6 (L) 22.0 - 29.0 mmol/L Final      BUN BUN   Date Value Ref Range Status   09/05/2023 20 8 - 23 mg/dL Final   09/05/2023 22 8 - 23 mg/dL Final   09/05/2023 24 (H) 8 - 23 mg/dL Final   09/04/2023 27 (H) 8 - 23 mg/dL Final   09/04/2023 33 (H) 8 - 23 mg/dL Final   09/04/2023 35 (H) 8 - 23 mg/dL Final   09/04/2023 37 (H) 8 - 23 mg/dL Final      Creatinine Creatinine   Date Value Ref Range Status   09/05/2023 0.85  0.76 - 1.27 mg/dL Final   09/05/2023 0.88 0.76 - 1.27 mg/dL Final   09/05/2023 0.91 0.76 - 1.27 mg/dL Final   09/04/2023 1.04 0.76 - 1.27 mg/dL Final   09/04/2023 1.26 0.76 - 1.27 mg/dL Final   09/04/2023 1.25 0.76 - 1.27 mg/dL Final   09/04/2023 1.54 (H) 0.76 - 1.27 mg/dL Final      Calcium Calcium   Date Value Ref Range Status   09/05/2023 6.7 (L) 8.6 - 10.5 mg/dL Final   09/05/2023 6.8 (L) 8.6 - 10.5 mg/dL Final   09/05/2023 6.7 (L) 8.6 - 10.5 mg/dL Final   09/04/2023 6.8 (L) 8.6 - 10.5 mg/dL Final   09/04/2023 7.0 (L) 8.6 - 10.5 mg/dL Final   09/04/2023 7.3 (L) 8.6 - 10.5 mg/dL Final   09/04/2023 8.1 (L) 8.6 - 10.5 mg/dL Final      PO4 No results found for: CAPO4   Albumin Albumin   Date Value Ref Range Status   09/04/2023 3.6 3.5 - 5.2 g/dL Final      Magnesium No results found for: MG   Uric Acid No results found for: URICACID     Radiology results :     Imaging Results (Last 72 Hours)       Procedure Component Value Units Date/Time    CT Head Without Contrast [[id number]] Collected: 09/04/23 1740     Updated: 09/04/23 1745    Narrative:      COMPARISON:None available.     TECHNIQUE:CT images were obtained from the foramen magnum to the vertex  without the use of intravenous contrast on a multidetector CT. CT scan  performed according to as low as reasonably achieved dose protocol.     FINDINGS: Mild parenchymal volume loss noted.      There is no mass,mass effect or hemorrhage. No hydrocephalus. Posterior  fossa structures are unremarkable.   Subtle lucency in the left basal ganglia     The limited visualized orbits and paranasal sinuses are unremarkable.  The mastoid air cells are clear,bilaterally.  There are no calvarial abnormalities seen.       Impression:         1. No CT demonstrable acute intracranial abnormality.  2. Small lucency in the left basal ganglia may suggest remote lacunar  infarction.     This report was finalized on 9/4/2023 5:43 PM by Aleja Farias MD.       XR Chest 1 View [[id number]]  Collected: 09/04/23 1142     Updated: 09/04/23 1203    Narrative:         Comparison: No previous.     Technique: Chest x-ray to upright portable AP views performed according  to as low as reasonably achieved dose protocol.     Findings: There are scattered areas of minimal scarring, atelectasis  and/or interstitial infiltrate throughout both lungs.  No dense  atelectasis or consolidation.  There is no pneumothorax or pleural fluid  collection.  No discrete nodules or masses are identified.  Heart size  and mediastinal contour are normal.  The bony structures appear intact  with minimal degenerative changes.       Impression:      Impression:  1.  Minimal diffuse pulmonary opacity due to scarring, atelectasis  and/or interstitial infiltrate.  2.  No dense atelectasis or consolidation.     This report was finalized on 9/4/2023 11:44 AM by Floyd Felix MD.                 Medications:      apixaban, 5 mg, Oral, Q12H  metoprolol tartrate, 50 mg, Oral, Q12H  senna-docusate sodium, 2 tablet, Oral, BID  sodium chloride, 10 mL, Intravenous, Q12H      dilTIAZem, 5-15 mg/hr, Last Rate: 5 mg/hr (09/05/23 0354)        Assessment & Plan       Hyponatremia    -KAL, resolved  - Presumed hypotonic presumed chronic hyponatremia  - Hypothyroidism  - Atrial fibrillation  - Essential hypertension  - Type 2 diabetes mellitus      Hyponatremia likely multifactorial including HCTZ, polydipsia and SIADH, nausea induced.  Admitted with 119 received 3% saline overnight.  Sodium has improved to 121, will continue on fluid restriction 1 L in 24 hours.  I have ordered a sodium recheck at noon and will decide on maintenance management.  Thanks Dr campuzano for the consult. Nephrology will follow the patient.   I discussed the patient's findings and my recommendations with patient and consulting provider    Shell Edgar MD  09/05/23  09:19 EDT

## 2023-09-05 NOTE — THERAPY EVALUATION
Patient Name: Clinton Hernandez  : 1950    MRN: [id number]                              Today's Date: 2023       Admit Date: 2023    Visit Dx:     ICD-10-CM ICD-9-CM   1. Atrial fibrillation with rapid ventricular response  I48.91 427.31   2. Hyponatremia  E87.1 276.1   3. Acute kidney injury  N17.9 584.9     Patient Active Problem List   Diagnosis    Paroxysmal atrial fibrillation    Chronic anticoagulation,eliquis    Mixed hyperlipidemia    Hypothyroidism (acquired)    Essential hypertension    Chest pain in adult    Ventricular tachycardia, non-sustained    Hyponatremia     Past Medical History:   Diagnosis Date    Abnormal ECG     Arrhythmia     Atrial fibrillation     Bowel trouble     Diabetes mellitus     Hyperlipidemia     Hypertension     Hypothyroid     Rheumatoid aortitis      Past Surgical History:   Procedure Laterality Date    CARDIAC ABLATION      CATHETER ATRIAL SUPRAVENTRICULAR TACHYCARDIA    CARDIAC CATHETERIZATION      CARPAL TUNNEL RELEASE      COLECTOMY PARTIAL / TOTAL      HERNIA REPAIR      KNEE ARTHROSCOPY      BOTH KNEES      General Information       Row Name 23 1305          OT Time and Intention    Document Type evaluation  -LM     Mode of Treatment occupational therapy  -LM       Row Name 23 1305          General Information    Patient Profile Reviewed yes  -LM     Prior Level of Function independent:;ADL's;all household mobility;transfer  -LM     Existing Precautions/Restrictions fall;oxygen therapy device and L/min  -LM     Barriers to Rehab medically complex  -LM       Row Name 23 1305          Living Environment    People in Home spouse  -LM       Row Name 23 1305          Cognition    Orientation Status (Cognition) oriented x 3  -LM       Row Name 23 1305          Safety Issues, Functional Mobility    Impairments Affecting Function (Mobility) balance;endurance/activity tolerance;range of motion (ROM);strength  -LM                User Key  (r) = Recorded By, (t) = Taken By, (c) = Cosigned By      Initials Name Provider Type    LM Kaela West, OT Occupational Therapist                     Mobility/ADL's       Row Name 09/05/23 1306          Transfers    Transfers toilet transfer  -     Comment, (Transfers) not tested at time of eval  -       Row Name 09/05/23 1306          Activities of Daily Living    BADL Assessment/Intervention bathing;upper body dressing;lower body dressing;grooming;feeding;toileting  -       Row Name 09/05/23 1306          Bathing Assessment/Intervention    [place] Level (Bathing) maximum assist (25% patient effort)  -       Row Name 09/05/23 1306          Upper Body Dressing Assessment/Training    [place] Level (Upper Body Dressing) maximum assist (25% patient effort)  -       Row Name 09/05/23 1306          Lower Body Dressing Assessment/Training    [place] Level (Lower Body Dressing) maximum assist (25% patient effort)  -       Row Name 09/05/23 1306          Grooming Assessment/Training    [place] Level (Grooming) set up  -       Row Name 09/05/23 1306          Self-Feeding Assessment/Training    [place] Level (Feeding) set up  -       Row Name 09/05/23 1306          Toileting Assessment/Training    [place] Level (Toileting) maximum assist (25% patient effort)  -               User Key  (r) = Recorded By, (t) = Taken By, (c) = Cosigned By      Initials Name Provider Type    LM Kaela West, OT Occupational Therapist                   Obj/Interventions       Row Name 09/05/23 1308          Sensory Assessment (Somatosensory)    Sensory Assessment (Somatosensory) sensation intact  -       Row Name 09/05/23 1308          Vision Assessment/Intervention    Visual Impairment/Limitations WFL  -       Row Name 09/05/23 1308          Range of Motion Comprehensive    General Range of Motion upper extremity range of motion deficits identified  -     Comment, General  Range of Motion 3/4 bilateral shoulder flexion  -LM       Row Name 09/05/23 1308          Strength Comprehensive (MMT)    General Manual Muscle Testing (MMT) Assessment upper extremity strength deficits identified  -LM     Comment, General Manual Muscle Testing (MMT) Assessment bue shoulder flexion 3-/5  -LM       Row Name 09/05/23 1308          Motor Skills    Motor Skills functional endurance  -LM     Functional Endurance F-  -LM               User Key  (r) = Recorded By, (t) = Taken By, (c) = Cosigned By      Initials Name Provider Type    LM Kaela West, OT Occupational Therapist                   Goals/Plan       Row Name 09/05/23 1313          Transfer Goal 1 (OT)    Activity/Assistive Device (Transfer Goal 1, OT) transfers, all;commode, 3-in-1;walker, rolling  -LM     [place] Level/Cues Needed (Transfer Goal 1, OT) minimum assist (75% or more patient effort);contact guard required  -LM     Time Frame (Transfer Goal 1, OT) by discharge  -LM       Row Name 09/05/23 1313          Problem Specific Goal 1 (OT)    Problem Specific Goal 1 (OT) increase fxl activity tolerance to F to assist with badl and fxl mobility  -LM     Time Frame (Problem Specific Goal 1, OT) by discharge  -LM       Row Name 09/05/23 1313          Therapy Assessment/Plan (OT)    Planned Therapy Interventions (OT) activity tolerance training;adaptive equipment training;BADL retraining;ROM/therapeutic exercise;transfer/mobility retraining;patient/caregiver education/training;strengthening exercise  -LM               User Key  (r) = Recorded By, (t) = Taken By, (c) = Cosigned By      Initials Name Provider Type    LM Kaela West OT Occupational Therapist                   Clinical Impression       Row Name 09/05/23 1309          Plan of Care Review    Plan of Care Reviewed With patient  -LM       Row Name 09/05/23 1309          Therapy Assessment/Plan (OT)    Patient/Family Therapy Goal Statement (OT) return home to plof  -      Rehab Potential (OT) good, to achieve stated therapy goals  -     Criteria for Skilled Therapeutic Interventions Met (OT) yes;meets criteria;skilled treatment is necessary  -     Therapy Frequency (OT) other (see comments)  prn and/or to monitor fxl progress  -       Row Name 09/05/23 1307          Therapy Plan Review/Discharge Plan (OT)    Anticipated Discharge Disposition (OT) home with assist  -       Row Name 09/05/23 1302          Positioning and Restraints    Post Treatment Position bed  -LM     In Bed call light within reach;encouraged to call for assist;exit alarm on  -LM               User Key  (r) = Recorded By, (t) = Taken By, (c) = Cosigned By      Initials Name Provider Type     Kaela West, OT Occupational Therapist                   Outcome Measures    No documentation.                     OT Recommendation and Plan  Planned Therapy Interventions (OT): activity tolerance training, adaptive equipment training, BADL retraining, ROM/therapeutic exercise, transfer/mobility retraining, patient/caregiver education/training, strengthening exercise  Therapy Frequency (OT): other (see comments) (prn and/or to monitor fxl progress)  Plan of Care Review  Plan of Care Reviewed With: patient     Time Calculation:          Therapy Charges for Today       Code Description Service Date Service Provider Modifiers Qty    [id number]  OT EVAL MOD COMPLEXITY 4 9/5/2023 Kaela West, OT GO 1                 Kaela West OT  9/5/2023

## 2023-09-05 NOTE — PROGRESS NOTES
Repeat sodium level noted to be 119.    Results from last 7 days   Lab Units 09/04/23  2238 09/04/23  1652 09/04/23  1309   SODIUM mmol/L 119* 120* 120*   POTASSIUM mmol/L 3.5 4.6 4.1   CHLORIDE mmol/L 92* 94* 92*   CO2 mmol/L 16.4* 12.3* 13.6*   BUN mg/dL 27* 33* 35*   CREATININE mg/dL 1.04 1.26 1.25   GLUCOSE mg/dL 116* 113* 116*   CALCIUM mg/dL 6.8* 7.0* 7.3*     I have contacted Dr. Tate, on call Nephrologist and discussed the case and laboratory findings.     Orders:  -DC the sodium bicarbonate infusion  -50 mL bolus hypertonic saline to be given over 15 minutes; also d/w Jenn clinical pharmacist  -Repeat BMP to follow sodium level 30 minutes after bolus complete  -Nursing staff to contact Dr. Tate with repeat chemistry panel for further recommendations

## 2023-09-05 NOTE — PROGRESS NOTES
[facility] HOSPITALIST PROGRESS NOTE     Patient Identification:  Name:  Clinton Hernandez  Age:  72 y.o.  Sex:  male  :  1950  MRN:  [id number]  Visit Number:  [id number]  ROOM: [address]     Primary Care Provider:  Caroline Fernando MD    Length of stay in inpatient status:  1    Subjective     Chief Compliant:    Chief Complaint   Patient presents with    Weakness - Generalized    Shortness of Breath       History of Presenting Illness:    Patient reports feeling about the same. Wife supportive bedside. Patient's responses are more clear and quicker today. He notes feeling slightly better HR in 120's on 5 of ditliazem gtt still. He reports he still feels as weak and was unable to walk with PT.     Last night patient started on CPAP for JOHNSON. Na also dropped and was given 2 rounds of hypertonic saline for worsening hyponatremia.     ROS:  Otherwise 10 point ROS negative other than documented above in HPI.     Objective     Current Hospital Meds:apixaban, 5 mg, Oral, Q12H  aspirin, 81 mg, Oral, Daily  atorvastatin, 20 mg, Oral, Nightly  folic acid, 1,000 mcg, Oral, Daily  levothyroxine, 75 mcg, Oral, Q AM  metoprolol tartrate, 50 mg, Oral, Q12H  pantoprazole, 40 mg, Oral, QAM AC  senna-docusate sodium, 2 tablet, Oral, BID  sertraline, 100 mg, Oral, Daily  sodium chloride, 10 mL, Intravenous, Q12H  sulfaSALAzine, 500 mg, Oral, BID    dilTIAZem, 5-15 mg/hr, Last Rate: 5 mg/hr (23 0354)        Current Antimicrobial Therapy:  Anti-Infectives (From admission, onward)      None          Current Diuretic Therapy:  Diuretics (From admission, onward)      None          ----------------------------------------------------------------------------------------------------------------------  Vital Signs:  Temp:  [97.4 °F (36.3 °C)-99.3 °F (37.4 °C)] 97.9 °F (36.6 °C)  Heart Rate:  [] 103  Resp:  [12-34] 28  BP: (102-150)/() 115/56  SpO2:  [63 %-99 %] 96 %  on  Flow (L/min):  [2-3] 3;   Device  (Oxygen Therapy): nasal cannula  Body mass index is 33.8 kg/m².    Wt Readings from Last 3 Encounters:   09/05/23 97.9 kg (215 lb 13.3 oz)   02/03/23 102 kg (225 lb)   02/03/22 95.7 kg (211 lb)     Intake & Output (last 3 days)         09/02 0701  09/03 0700 09/03 0701 09/04 0700 09/04 0701 09/05 0700 09/05 0701 09/06 0700    P.O.   1000 120    I.V. (mL/kg)   1634.9 (16.7)     IV Piggyback   100     Total Intake(mL/kg)   2734.9 (27.9) 120 (1.2)    Urine (mL/kg/hr)   1025 850 (1.9)    Total Output   1025 850    Net   +1709.9 -730            Urine Unmeasured Occurrence   1 x           Diet: Diabetic Diets, Fluid Restriction (240 mL/tray) Diets; Consistent Carbohydrate; 1000 mL/day; Texture: Regular Texture (IDDSI 7); Fluid Consistency: Thin (IDDSI 0)  ----------------------------------------------------------------------------------------------------------------------  Physical exam:  Constitutional:  Well-developed and well-nourished.  No respiratory distress.      HENT:  Head:  Normocephalic and atraumatic.  Mouth:  Moist mucous membranes.    Eyes:  Conjunctivae and EOM are normal. No scleral icterus.    Neck:  Neck supple.  No JVD present.    Cardiovascular:  Normal rate, regular rhythm and normal heart sounds with no murmur.  Pulmonary/Chest:  No respiratory distress, no wheezes, no crackles, with normal breath sounds and good air movement.  Abdominal:  Soft.  Bowel sounds are normal.  No distension and no tenderness.   Musculoskeletal:  No edema, no tenderness, and no deformity.  No red or swollen joints anywhere.    Neurological:  Alert and oriented to person, place, and time.  4/5 strength in all extremities.   Skin:  Skin is warm and dry. No rash noted. No pallor.   Peripheral vascular:  Pulses in all 4 extremities with no clubbing, no cyanosis, no edema.  ----------------------------------------------------------------------------------------------------------------------  Tele:     ----------------------------------------------------------------------------------------------------------------------  Results from last 7 days   Lab Units 09/04/23  2238 09/04/23  1046   WBC 10*3/mm3 13.02* 16.24*   HEMOGLOBIN g/dL 13.7 16.5   HEMATOCRIT % 40.4 46.9   MCV fL 98.5* 96.7   MCHC g/dL 33.9 35.2   PLATELETS 10*3/mm3 117* 136*         Results from last 7 days   Lab Units 09/05/23  0648 09/05/23  0435 09/05/23  0203 09/04/23  1309 09/04/23  1046   SODIUM mmol/L 121* 121* 120*   < > 119*   POTASSIUM mmol/L 3.8 3.9 3.9   < > 4.2   CHLORIDE mmol/L 94* 93* 92*   < > 88*   CO2 mmol/L 17.7* 18.8* 17.2*   < > 13.6*   BUN mg/dL 20 22 24*   < > 37*   CREATININE mg/dL 0.85 0.88 0.91   < > 1.54*   CALCIUM mg/dL 6.7* 6.8* 6.7*   < > 8.1*   GLUCOSE mg/dL 99 96 100*   < > 146*   ALBUMIN g/dL  --   --   --   --  3.6   BILIRUBIN mg/dL  --   --   --   --  0.9   ALK PHOS U/L  --   --   --   --  63   AST (SGOT) U/L  --   --   --   --  146*   ALT (SGPT) U/L  --   --   --   --  83*    < > = values in this interval not displayed.   Estimated Creatinine Clearance: 87.6 mL/min (by C-G formula based on SCr of 0.85 mg/dL).  No results found for: AMMONIA  Results from last 7 days   Lab Units 09/05/23  0908 09/04/23  1309 09/04/23  1046   CK TOTAL U/L 1,067* 2,261*  --    HSTROP T ng/L  --  44* 55*         Results from last 7 days   Lab Units 09/05/23  0908   TRIGLYCERIDES mg/dL 217*     No results found for: HGBA1C, POCGLU  Lab Results   Component Value Date    TSH 3.530 09/04/2023     No results found for: PREGTESTUR, PREGSERUM, HCG, HCGQUANT  Pain Management Panel           No data to display              Brief Urine Lab Results  (Last result in the past 365 days)        Color   Clarity   Blood   Leuk Est   Nitrite   Protein   CREAT   Urine HCG        09/04/23 1650 Dark Yellow   Clear   Moderate (2+)   Negative   Negative   30 mg/dL (1+)                 No results found for: BLOODCX  No results found for: URINECX  No results  found for: WOUNDCX  No results found for: STOOLCX  No results found for: RESPCX  No results found for: AFBCX        I have personally looked at the labs and they are summarized above.  ----------------------------------------------------------------------------------------------------------------------  Detailed radiology reports for the last 24 hours:    Imaging Results (Last 24 Hours)       Procedure Component Value Units Date/Time    CT Head Without Contrast [[id number]] Collected: 09/04/23 1740     Updated: 09/04/23 1745    Narrative:      COMPARISON:None available.     TECHNIQUE:CT images were obtained from the foramen magnum to the vertex  without the use of intravenous contrast on a multidetector CT. CT scan  performed according to as low as reasonably achieved dose protocol.     FINDINGS: Mild parenchymal volume loss noted.      There is no mass,mass effect or hemorrhage. No hydrocephalus. Posterior  fossa structures are unremarkable.   Subtle lucency in the left basal ganglia     The limited visualized orbits and paranasal sinuses are unremarkable.  The mastoid air cells are clear,bilaterally.  There are no calvarial abnormalities seen.       Impression:         1. No CT demonstrable acute intracranial abnormality.  2. Small lucency in the left basal ganglia may suggest remote lacunar  infarction.     This report was finalized on 9/4/2023 5:43 PM by Aleja Farias MD.       XR Chest 1 View [[id number]] Collected: 09/04/23 1142     Updated: 09/04/23 1203    Narrative:         Comparison: No previous.     Technique: Chest x-ray to upright portable AP views performed according  to as low as reasonably achieved dose protocol.     Findings: There are scattered areas of minimal scarring, atelectasis  and/or interstitial infiltrate throughout both lungs.  No dense  atelectasis or consolidation.  There is no pneumothorax or pleural fluid  collection.  No discrete nodules or masses are identified.  Heart size  and  mediastinal contour are normal.  The bony structures appear intact  with minimal degenerative changes.       Impression:      Impression:  1.  Minimal diffuse pulmonary opacity due to scarring, atelectasis  and/or interstitial infiltrate.  2.  No dense atelectasis or consolidation.     This report was finalized on 9/4/2023 11:44 AM by Floyd Felix MD.             Assessment & Plan    #Hyponatremia  - Urine sodium initially 26, after fluids 31. Urine osm 644.   - Initially thought to be solely due to hypovolemia but did not improve with isotonic IVF  - Suspect multifactorial. HCTZ being held. Fluid restriction for polydipsia.   - Nephrology consulted and giving hypertonic saline with frequent checks. Appreciate help.     #Weakness   #Falls  - CT revealed possible old infarct but nothing acute.   - Could be related to hyponatremia. No asymmetry on exam.   - Will investigate other possibilities lower on differential if does not improve with sodium and PT/OT    #Afib w/ RVR  - Patient currently on diltiazem gtt. HR still on higher side. Increased PO metoprolol to 75 mg BID and will wean diltiazem gtt as able.   - [id number]: 4, continue apixiban.     #NSTEMI, Type II  - Troponin 55=>44. No chest pain. Nonspecific ST changes on EKG. Echo pending. Suspect from renal injury and afib.     #Hepatocellular transaminitis  - Unclear etiology. Repeat in AM. Will get hepatitis screen.     #KAL  - Cr baseline unclear but was 0.8-0.9 in 1.54. Await records from Dr. Fernando's office.   - Cr quickly improved with IVF. Near baseline now. Supportive of prerenal etiology.     Chronic:  Hypothyroidism- TSH wnl  HLD- Hold statin given elevated LFTs and CK  HTN- Restart home meds as needed.   Rheumatoid arthritis- Restart home regimen   T2DM- SSI  JOHNSON- QHS BIPAP    Code status: Full     Dispo: Pending clinical improvement. PT/OT consulted.       Mechanical Order History:       None          Pharmalogical Order History:        Ordered      Dose Route Frequency Stop    09/04/23 2146  apixaban (ELIQUIS) tablet 5 mg         5 mg PO Every 12 Hours Scheduled --    09/04/23 1404  apixaban (ELIQUIS) tablet 5 mg  Status:  Discontinued         5 mg PO Every 12 Hours 09/04/23 2146                    Phoenix Delcid MD  [facility]  09/05/23  11:31 EDT

## 2023-09-05 NOTE — PLAN OF CARE
Goal Outcome Evaluation:  Plan of Care Reviewed With: patient        Progress: no change  Outcome Evaluation: Pt presents w/ decreasd bed mobility, transfers, and gait. Pt would benefit from skilled PT while in house. D/C disposition depends on progress while in house.      Anticipated Discharge Disposition (PT):  (TBD)

## 2023-09-05 NOTE — PLAN OF CARE
Goal Outcome Evaluation:              Outcome Evaluation: Sodium level 120 3% saline infusing as orderred.  BMP 4hrs after saline infusion call results to Dr Edgar.  Echo 61-65%.  Cardizem infusing as ordered.  Family at bedside.

## 2023-09-06 LAB
ALBUMIN SERPL-MCNC: 2.6 G/DL (ref 3.5–5.2)
ALBUMIN/GLOB SERPL: 1 G/DL
ALP SERPL-CCNC: 45 U/L (ref 39–117)
ALT SERPL W P-5'-P-CCNC: 73 U/L (ref 1–41)
ANION GAP SERPL CALCULATED.3IONS-SCNC: 7.3 MMOL/L (ref 5–15)
ANION GAP SERPL CALCULATED.3IONS-SCNC: 7.4 MMOL/L (ref 5–15)
ANION GAP SERPL CALCULATED.3IONS-SCNC: 7.9 MMOL/L (ref 5–15)
ANION GAP SERPL CALCULATED.3IONS-SCNC: 7.9 MMOL/L (ref 5–15)
ANION GAP SERPL CALCULATED.3IONS-SCNC: 8.1 MMOL/L (ref 5–15)
ANISOCYTOSIS BLD QL: ABNORMAL
AST SERPL-CCNC: 106 U/L (ref 1–40)
BILIRUB SERPL-MCNC: 0.7 MG/DL (ref 0–1.2)
BUN SERPL-MCNC: 16 MG/DL (ref 8–23)
BUN SERPL-MCNC: 17 MG/DL (ref 8–23)
BUN SERPL-MCNC: 17 MG/DL (ref 8–23)
BUN/CREAT SERPL: 19.5 (ref 7–25)
BUN/CREAT SERPL: 20 (ref 7–25)
BUN/CREAT SERPL: 20 (ref 7–25)
BUN/CREAT SERPL: 20.5 (ref 7–25)
BUN/CREAT SERPL: 22.4 (ref 7–25)
CALCIUM SPEC-SCNC: 6.9 MG/DL (ref 8.6–10.5)
CALCIUM SPEC-SCNC: 6.9 MG/DL (ref 8.6–10.5)
CALCIUM SPEC-SCNC: 7 MG/DL (ref 8.6–10.5)
CALCIUM SPEC-SCNC: 7.1 MG/DL (ref 8.6–10.5)
CALCIUM SPEC-SCNC: 7.2 MG/DL (ref 8.6–10.5)
CHLORIDE SERPL-SCNC: 100 MMOL/L (ref 98–107)
CHLORIDE SERPL-SCNC: 96 MMOL/L (ref 98–107)
CHLORIDE SERPL-SCNC: 96 MMOL/L (ref 98–107)
CHLORIDE SERPL-SCNC: 97 MMOL/L (ref 98–107)
CHLORIDE SERPL-SCNC: 98 MMOL/L (ref 98–107)
CO2 SERPL-SCNC: 18.1 MMOL/L (ref 22–29)
CO2 SERPL-SCNC: 18.1 MMOL/L (ref 22–29)
CO2 SERPL-SCNC: 19.7 MMOL/L (ref 22–29)
CO2 SERPL-SCNC: 19.9 MMOL/L (ref 22–29)
CO2 SERPL-SCNC: 20.6 MMOL/L (ref 22–29)
CREAT SERPL-MCNC: 0.76 MG/DL (ref 0.76–1.27)
CREAT SERPL-MCNC: 0.8 MG/DL (ref 0.76–1.27)
CREAT SERPL-MCNC: 0.8 MG/DL (ref 0.76–1.27)
CREAT SERPL-MCNC: 0.82 MG/DL (ref 0.76–1.27)
CREAT SERPL-MCNC: 0.83 MG/DL (ref 0.76–1.27)
DEPRECATED RDW RBC AUTO: 47.7 FL (ref 37–54)
EGFRCR SERPLBLD CKD-EPI 2021: 93 ML/MIN/1.73
EGFRCR SERPLBLD CKD-EPI 2021: 93.3 ML/MIN/1.73
EGFRCR SERPLBLD CKD-EPI 2021: 94 ML/MIN/1.73
EGFRCR SERPLBLD CKD-EPI 2021: 94 ML/MIN/1.73
EGFRCR SERPLBLD CKD-EPI 2021: 95.5 ML/MIN/1.73
EOSINOPHIL # BLD MANUAL: 0.1 10*3/MM3 (ref 0–0.4)
EOSINOPHIL NFR BLD MANUAL: 1 % (ref 0.3–6.2)
ERYTHROCYTE [DISTWIDTH] IN BLOOD BY AUTOMATED COUNT: 12.9 % (ref 12.3–15.4)
GLOBULIN UR ELPH-MCNC: 2.7 GM/DL
GLUCOSE BLDC GLUCOMTR-MCNC: 113 MG/DL (ref 70–130)
GLUCOSE BLDC GLUCOMTR-MCNC: 140 MG/DL (ref 70–130)
GLUCOSE BLDC GLUCOMTR-MCNC: 98 MG/DL (ref 70–130)
GLUCOSE BLDC GLUCOMTR-MCNC: 99 MG/DL (ref 70–130)
GLUCOSE SERPL-MCNC: 106 MG/DL (ref 65–99)
GLUCOSE SERPL-MCNC: 117 MG/DL (ref 65–99)
GLUCOSE SERPL-MCNC: 127 MG/DL (ref 65–99)
HAV IGM SERPL QL IA: NORMAL
HBV CORE IGM SERPL QL IA: NORMAL
HBV SURFACE AG SERPL QL IA: NORMAL
HCT VFR BLD AUTO: 41 % (ref 37.5–51)
HCV AB SER DONR QL: NORMAL
HGB BLD-MCNC: 13.7 G/DL (ref 13–17.7)
LYMPHOCYTES # BLD MANUAL: 1.32 10*3/MM3 (ref 0.7–3.1)
LYMPHOCYTES NFR BLD MANUAL: 9 % (ref 5–12)
MCH RBC QN AUTO: 33.3 PG (ref 26.6–33)
MCHC RBC AUTO-ENTMCNC: 33.4 G/DL (ref 31.5–35.7)
MCV RBC AUTO: 99.8 FL (ref 79–97)
MONOCYTES # BLD: 0.91 10*3/MM3 (ref 0.1–0.9)
NEUTROPHILS # BLD AUTO: 7.79 10*3/MM3 (ref 1.7–7)
NEUTROPHILS NFR BLD MANUAL: 77 % (ref 42.7–76)
OSMOLALITY UR: 549 MOSM/KG
PLAT MORPH BLD: NORMAL
PLATELET # BLD AUTO: 112 10*3/MM3 (ref 140–450)
PMV BLD AUTO: 9.3 FL (ref 6–12)
POTASSIUM SERPL-SCNC: 3.8 MMOL/L (ref 3.5–5.2)
POTASSIUM SERPL-SCNC: 3.9 MMOL/L (ref 3.5–5.2)
POTASSIUM SERPL-SCNC: 3.9 MMOL/L (ref 3.5–5.2)
POTASSIUM SERPL-SCNC: 4 MMOL/L (ref 3.5–5.2)
POTASSIUM SERPL-SCNC: 4.2 MMOL/L (ref 3.5–5.2)
PROT SERPL-MCNC: 5.3 G/DL (ref 6–8.5)
RBC # BLD AUTO: 4.11 10*6/MM3 (ref 4.14–5.8)
REF LAB TEST METHOD: NORMAL
SODIUM SERPL-SCNC: 122 MMOL/L (ref 136–145)
SODIUM SERPL-SCNC: 122 MMOL/L (ref 136–145)
SODIUM SERPL-SCNC: 125 MMOL/L (ref 136–145)
SODIUM SERPL-SCNC: 125 MMOL/L (ref 136–145)
SODIUM SERPL-SCNC: 126 MMOL/L (ref 136–145)
SODIUM SERPL-SCNC: 128 MMOL/L (ref 136–145)
TOXIC GRANULATION: ABNORMAL
VARIANT LYMPHS NFR BLD MANUAL: 13 % (ref 19.6–45.3)
WBC NRBC COR # BLD: 10.12 10*3/MM3 (ref 3.4–10.8)

## 2023-09-06 PROCEDURE — 25010000002 HEPARIN (PORCINE) PER 1000 UNITS: Performed by: INTERNAL MEDICINE

## 2023-09-06 PROCEDURE — 94799 UNLISTED PULMONARY SVC/PX: CPT

## 2023-09-06 PROCEDURE — 84295 ASSAY OF SERUM SODIUM: CPT | Performed by: INTERNAL MEDICINE

## 2023-09-06 PROCEDURE — 97110 THERAPEUTIC EXERCISES: CPT

## 2023-09-06 PROCEDURE — 80053 COMPREHEN METABOLIC PANEL: CPT | Performed by: INTERNAL MEDICINE

## 2023-09-06 PROCEDURE — 82948 REAGENT STRIP/BLOOD GLUCOSE: CPT

## 2023-09-06 PROCEDURE — 25010000002 FUROSEMIDE PER 20 MG: Performed by: INTERNAL MEDICINE

## 2023-09-06 PROCEDURE — 85007 BL SMEAR W/DIFF WBC COUNT: CPT | Performed by: INTERNAL MEDICINE

## 2023-09-06 PROCEDURE — 97530 THERAPEUTIC ACTIVITIES: CPT

## 2023-09-06 PROCEDURE — 85025 COMPLETE CBC W/AUTO DIFF WBC: CPT | Performed by: INTERNAL MEDICINE

## 2023-09-06 PROCEDURE — 94660 CPAP INITIATION&MGMT: CPT

## 2023-09-06 PROCEDURE — 80074 ACUTE HEPATITIS PANEL: CPT | Performed by: INTERNAL MEDICINE

## 2023-09-06 RX ORDER — SODIUM CHLORIDE 0.9 % (FLUSH) 0.9 %
10 SYRINGE (ML) INJECTION EVERY 12 HOURS SCHEDULED
Status: DISCONTINUED | OUTPATIENT
Start: 2023-09-06 | End: 2023-09-07 | Stop reason: HOSPADM

## 2023-09-06 RX ORDER — DEXTROSE MONOHYDRATE 50 MG/ML
75 INJECTION, SOLUTION INTRAVENOUS AS NEEDED
Status: DISCONTINUED | OUTPATIENT
Start: 2023-09-06 | End: 2023-09-07 | Stop reason: HOSPADM

## 2023-09-06 RX ORDER — SODIUM CHLORIDE 1 G/1
2 TABLET ORAL 2 TIMES DAILY WITH MEALS
Status: DISCONTINUED | OUTPATIENT
Start: 2023-09-06 | End: 2023-09-07

## 2023-09-06 RX ORDER — SODIUM CHLORIDE 0.9 % (FLUSH) 0.9 %
10 SYRINGE (ML) INJECTION AS NEEDED
Status: DISCONTINUED | OUTPATIENT
Start: 2023-09-06 | End: 2023-09-07 | Stop reason: HOSPADM

## 2023-09-06 RX ORDER — SODIUM CHLORIDE 1 G/1
2 TABLET ORAL
Status: DISCONTINUED | OUTPATIENT
Start: 2023-09-06 | End: 2023-09-06

## 2023-09-06 RX ORDER — 3% SODIUM CHLORIDE 3 G/100ML
30 INJECTION, SOLUTION INTRAVENOUS ONCE
Status: COMPLETED | OUTPATIENT
Start: 2023-09-06 | End: 2023-09-06

## 2023-09-06 RX ORDER — FUROSEMIDE 10 MG/ML
10 INJECTION INTRAMUSCULAR; INTRAVENOUS ONCE
Status: COMPLETED | OUTPATIENT
Start: 2023-09-06 | End: 2023-09-06

## 2023-09-06 RX ORDER — HEPARIN SODIUM 5000 [USP'U]/ML
5000 INJECTION, SOLUTION INTRAVENOUS; SUBCUTANEOUS EVERY 8 HOURS SCHEDULED
Status: DISCONTINUED | OUTPATIENT
Start: 2023-09-06 | End: 2023-09-07 | Stop reason: HOSPADM

## 2023-09-06 RX ORDER — SODIUM CHLORIDE 9 MG/ML
40 INJECTION, SOLUTION INTRAVENOUS AS NEEDED
Status: DISCONTINUED | OUTPATIENT
Start: 2023-09-06 | End: 2023-09-07 | Stop reason: HOSPADM

## 2023-09-06 RX ADMIN — SODIUM CHLORIDE 30 ML/HR: 3 INJECTION, SOLUTION INTRAVENOUS at 02:13

## 2023-09-06 RX ADMIN — SERTRALINE 100 MG: 50 TABLET, FILM COATED ORAL at 09:17

## 2023-09-06 RX ADMIN — ASPIRIN 81 MG: 81 TABLET, CHEWABLE ORAL at 09:16

## 2023-09-06 RX ADMIN — METOPROLOL TARTRATE 75 MG: 50 TABLET, FILM COATED ORAL at 20:23

## 2023-09-06 RX ADMIN — METOPROLOL TARTRATE 75 MG: 50 TABLET, FILM COATED ORAL at 09:17

## 2023-09-06 RX ADMIN — HEPARIN SODIUM 5000 UNITS: 5000 INJECTION INTRAVENOUS; SUBCUTANEOUS at 09:24

## 2023-09-06 RX ADMIN — SULFASALAZINE 500 MG: 500 TABLET ORAL at 20:23

## 2023-09-06 RX ADMIN — SODIUM CHLORIDE TAB 1 GM 2 G: 1 TAB at 11:01

## 2023-09-06 RX ADMIN — SULFASALAZINE 500 MG: 500 TABLET ORAL at 09:16

## 2023-09-06 RX ADMIN — Medication 10 ML: at 09:18

## 2023-09-06 RX ADMIN — DOCUSATE SODIUM 50 MG AND SENNOSIDES 8.6 MG 2 TABLET: 8.6; 5 TABLET, FILM COATED ORAL at 09:17

## 2023-09-06 RX ADMIN — SODIUM CHLORIDE TAB 1 GM 2 G: 1 TAB at 18:11

## 2023-09-06 RX ADMIN — FUROSEMIDE 10 MG: 10 INJECTION, SOLUTION INTRAMUSCULAR; INTRAVENOUS at 05:17

## 2023-09-06 RX ADMIN — HEPARIN SODIUM 5000 UNITS: 5000 INJECTION INTRAVENOUS; SUBCUTANEOUS at 14:44

## 2023-09-06 RX ADMIN — ZOLPIDEM TARTRATE 5 MG: 5 TABLET ORAL at 20:23

## 2023-09-06 RX ADMIN — Medication 10 ML: at 20:23

## 2023-09-06 RX ADMIN — LEVOTHYROXINE SODIUM 75 MCG: 0.07 TABLET ORAL at 05:17

## 2023-09-06 RX ADMIN — Medication 1000 MCG: at 09:16

## 2023-09-06 RX ADMIN — DOCUSATE SODIUM 50 MG AND SENNOSIDES 8.6 MG 2 TABLET: 8.6; 5 TABLET, FILM COATED ORAL at 20:23

## 2023-09-06 RX ADMIN — PANTOPRAZOLE SODIUM 40 MG: 40 TABLET, DELAYED RELEASE ORAL at 06:33

## 2023-09-06 RX ADMIN — HEPARIN SODIUM 5000 UNITS: 5000 INJECTION INTRAVENOUS; SUBCUTANEOUS at 22:00

## 2023-09-06 NOTE — PROGRESS NOTES
[facility] HOSPITALIST PROGRESS NOTE     Patient Identification:  Name:  Clinton Hernandez  Age:  72 y.o.  Sex:  male  :  1950  MRN:  [id number]  Visit Number:  [id number]  ROOM: [address]     Primary Care Provider:  Caroline Fernando MD    Length of stay in inpatient status:  2    Subjective     Chief Compliant:    Chief Complaint   Patient presents with    Weakness - Generalized    Shortness of Breath       History of Presenting Illness:    Patient reports feeling better. Weakness improved and he took steps with therapy today. Son supportive bedside noting he has also seen an improvement in his mentation.     ROS:  Otherwise 10 point ROS negative other than documented above in HPI.     Objective     Current Hospital Meds:aspirin, 81 mg, Oral, Daily  folic acid, 1,000 mcg, Oral, Daily  heparin (porcine), 5,000 Units, Subcutaneous, Q8H  insulin lispro, 2-7 Units, Subcutaneous, 4x Daily AC & at Bedtime  levothyroxine, 75 mcg, Oral, Q AM  metoprolol tartrate, 75 mg, Oral, Q12H  pantoprazole, 40 mg, Oral, QAM AC  senna-docusate sodium, 2 tablet, Oral, BID  sertraline, 100 mg, Oral, Daily  sodium chloride, 10 mL, Intravenous, Q12H  sodium chloride, 10 mL, Intravenous, Q12H  sodium chloride, 2 g, Oral, BID With Meals  sulfaSALAzine, 500 mg, Oral, BID    dilTIAZem, 5-15 mg/hr, Last Rate: Stopped (23 0733)        Current Antimicrobial Therapy:  Anti-Infectives (From admission, onward)      None          Current Diuretic Therapy:  Diuretics (From admission, onward)      Ordered     Dose/Rate Route Frequency Start Stop    23 0117  furosemide (LASIX) injection 10 mg        Ordering Provider: Willian Tate MD    10 mg Intravenous Once 23 0500 23 0517          ----------------------------------------------------------------------------------------------------------------------  Vital Signs:  Temp:  [97.8 °F (36.6 °C)-99.3 °F (37.4 °C)] 98.3 °F (36.8 °C)  Heart Rate:  [66-90]  82  Resp:  [11-30] 16  BP: (103-141)/(54-92) 130/65  SpO2:  [91 %-99 %] 96 %  on  Flow (L/min):  [1-3] 1;   Device (Oxygen Therapy): room air  Body mass index is 33.87 kg/m².    Wt Readings from Last 3 Encounters:   09/06/23 98.1 kg (216 lb 4.3 oz)   02/03/23 102 kg (225 lb)   02/03/22 95.7 kg (211 lb)     Intake & Output (last 3 days)         09/04 0701 09/05 0700 09/05 0701 09/06 0700 09/06 0701 09/07 0700    P.O. 1000 742 882    I.V. (mL/kg) 1634.9 (16.7) 418.3 (4.3)     IV Piggyback 100      Total Intake(mL/kg) 2734.9 (27.9) 1160.3 (11.8) 882 (9)    Urine (mL/kg/hr) 1025 2000 (0.8) 950 (0.8)    Total Output 1025 2000 950    Net +1709.9 -839.7 -68           Urine Unmeasured Occurrence 1 x            Diet: Diabetic Diets, Fluid Restriction (240 mL/tray) Diets; Consistent Carbohydrate; 1000 mL/day; Texture: Regular Texture (IDDSI 7); Fluid Consistency: Thin (IDDSI 0)  ----------------------------------------------------------------------------------------------------------------------  Physical exam:  Constitutional:  Well-developed and well-nourished.  No respiratory distress.      HENT:  Head:  Normocephalic and atraumatic.  Mouth:  Moist mucous membranes.    Eyes:  Conjunctivae and EOM are normal. No scleral icterus.    Neck:  Neck supple.  No JVD present.    Cardiovascular:  Normal rate, regular rhythm and normal heart sounds with no murmur.  Pulmonary/Chest:  No respiratory distress, no wheezes, no crackles, with normal breath sounds and good air movement.  Abdominal:  Soft.  Bowel sounds are normal.  No distension and no tenderness.   Musculoskeletal:  No edema, no tenderness, and no deformity.  No red or swollen joints anywhere.    Neurological:  Alert and oriented to person, place, and time.  No cranial nerve deficit.  No tongue deviation.  No facial droop.  No slurred speech.   Skin:  Skin is warm and dry. No rash noted. No pallor.   Peripheral vascular:  Pulses in all 4 extremities with no clubbing, no  cyanosis, no edema.  ----------------------------------------------------------------------------------------------------------------------  Tele:    ----------------------------------------------------------------------------------------------------------------------  Results from last 7 days   Lab Units 09/06/23  0015 09/04/23  2238 09/04/23  1046   WBC 10*3/mm3 10.12 13.02* 16.24*   HEMOGLOBIN g/dL 13.7 13.7 16.5   HEMATOCRIT % 41.0 40.4 46.9   MCV fL 99.8* 98.5* 96.7   MCHC g/dL 33.4 33.9 35.2   PLATELETS 10*3/mm3 112* 117* 136*         Results from last 7 days   Lab Units 09/06/23  1607 09/06/23  1026 09/06/23  0848 09/06/23  0015 09/04/23  1309 09/04/23  1046   SODIUM mmol/L 128* 125* 125* 122*  122*   < > 119*   POTASSIUM mmol/L 4.0 4.2 3.8 3.9  3.9   < > 4.2   CHLORIDE mmol/L 100 97* 98 96*  96*   < > 88*   CO2 mmol/L 20.6* 19.9* 19.7* 18.1*  18.1*   < > 13.6*   BUN mg/dL 17 17 16 16  16   < > 37*   CREATININE mg/dL 0.76 0.83 0.82 0.80  0.80   < > 1.54*   CALCIUM mg/dL 7.1* 7.2* 7.0* 6.9*  6.9*   < > 8.1*   GLUCOSE mg/dL 106* 117* 127* 106*  106*   < > 146*   ALBUMIN g/dL  --   --   --  2.6*  --  3.6   BILIRUBIN mg/dL  --   --   --  0.7  --  0.9   ALK PHOS U/L  --   --   --  45  --  63   AST (SGOT) U/L  --   --   --  106*  --  146*   ALT (SGPT) U/L  --   --   --  73*  --  83*    < > = values in this interval not displayed.   Estimated Creatinine Clearance: 98 mL/min (by C-G formula based on SCr of 0.76 mg/dL).  No results found for: AMMONIA  Results from last 7 days   Lab Units 09/05/23  0908 09/04/23  1309 09/04/23  1046   CK TOTAL U/L 1,067* 2,261*  --    HSTROP T ng/L  --  44* 55*         Results from last 7 days   Lab Units 09/05/23  0908   TRIGLYCERIDES mg/dL 217*     Glucose   Date/Time Value Ref Range Status   09/06/2023 1712 140 (H) 70 - 130 mg/dL Final   09/06/2023 1104 99 70 - 130 mg/dL Final   09/06/2023 0633 113 70 - 130 mg/dL Final   09/05/2023 2035 105 70 - 130 mg/dL Final   09/05/2023  1739 117 70 - 130 mg/dL Final     Lab Results   Component Value Date    TSH 3.530 09/04/2023     No results found for: PREGTESTUR, PREGSERUM, HCG, HCGQUANT  Pain Management Panel           No data to display              Brief Urine Lab Results  (Last result in the past 365 days)        Color   Clarity   Blood   Leuk Est   Nitrite   Protein   CREAT   Urine HCG        09/04/23 1650 Dark Yellow   Clear   Moderate (2+)   Negative   Negative   30 mg/dL (1+)                 No results found for: BLOODCX  No results found for: URINECX  No results found for: WOUNDCX  No results found for: STOOLCX  No results found for: RESPCX  No results found for: AFBCX        I have personally looked at the labs and they are summarized above.  ----------------------------------------------------------------------------------------------------------------------  Detailed radiology reports for the last 24 hours:    Imaging Results (Last 24 Hours)       ** No results found for the last 24 hours. **          Assessment & Plan    #Hyponatremia  - Urine sodium initially 26, after fluids 31. Urine osm 644.   - Initially thought to be solely due to hypovolemia but did not improve with isotonic IVF  - Suspect multifactorial. HCTZ being held. Fluid restriction for polydipsia.   - Nephrology consulted and giving hypertonic saline with frequent checks. Now has slowly improved to 128. Symptoms improving with improvement in hyponatremia.      #Weakness   #Falls  - CT revealed possible old infarct but nothing acute.   - Could be related to hyponatremia. No asymmetry on exam.   - Will investigate other possibilities lower on differential if does not improve with sodium and PT/OT  - Outpatient neurology f/u. May have underlying mild dementia.      #Afib w/ RVR  - Now in sinus rhythm off diltiazem gtt   - [id number]: 4, continue apixiban.      #NSTEMI, Type II  - Troponin 55=>44. No chest pain. Nonspecific ST changes on EKG. Echo normal LVEF. Suspect from  renal injury and afib.      #Hepatocellular transaminitis  - Unclear etiology. Hepatitis screen negative. Improving.      #KAL  - Cr baseline unclear but was 0.8-0.9 in 1.54.   - Cr quickly improved with IVF. Near suspected baseline now at 0.8. Supportive of prerenal etiology.      Chronic:  Hypothyroidism- TSH wnl  HLD- Hold statin given elevated LFTs and CK  HTN- Restart home meds as needed.   Rheumatoid arthritis- Restart home regimen   T2DM- SSI  JOHNSON- QHS BIPAP     Code status: Full      Dispo: Pending clinical improvement. PT/OT consulted.        VTE Prophylaxis:   Mechanical Order History:       None          Pharmalogical Order History:        Ordered     Dose Route Frequency Stop    09/06/23 0714  heparin (porcine) 5000 UNIT/ML injection 5,000 Units         5,000 Units SC Every 8 Hours Scheduled --    09/04/23 2146  apixaban (ELIQUIS) tablet 5 mg  Status:  Discontinued         5 mg PO Every 12 Hours Scheduled 09/06/23 0714    09/04/23 1404  apixaban (ELIQUIS) tablet 5 mg  Status:  Discontinued         5 mg PO Every 12 Hours 09/04/23 2146                  Phoenix Delcid MD  [facility] Hospitalist  09/06/23  19:53 EDT

## 2023-09-06 NOTE — PLAN OF CARE
Goal Outcome Evaluation:           Progress: improving  Outcome Evaluation: Patient VSS on room air. AO x4. Patient is now in NSR. PO sodium replacement trending sodium. See providers orders. No further needs at this time. Bed low, locked, and in posistion. Call light in reach.

## 2023-09-06 NOTE — PLAN OF CARE
Goal Outcome Evaluation:  Plan of Care Reviewed With: patient        Progress: no change  Outcome Evaluation: Pt remains A+Ox4 with intermittent confusion. Sodium came back 122. See orders. Pt's oxygen saturation dropped to 60% at one point, patient refused to wear cpap. Cardizem gtt infusing. Sodium chloride 3% currently infusing at 30mL/hr for 6 hours. BP and HR has been stable. Afebrile. Bed in lowest position, alarm on. Call light in reach. Pt denies any requests at this time.

## 2023-09-06 NOTE — THERAPY TREATMENT NOTE
Acute Care - Physical Therapy Treatment Note   [facility]     Patient Name: Clinton Hernandez  : 1950  MRN: [id number]  Today's Date: 2023   Onset of Illness/Injury or Date of Surgery: 23  Visit Dx:     ICD-10-CM ICD-9-CM   1. Atrial fibrillation with rapid ventricular response  I48.91 427.31   2. Hyponatremia  E87.1 276.1   3. Acute kidney injury  N17.9 584.9     Patient Active Problem List   Diagnosis    Paroxysmal atrial fibrillation    Chronic anticoagulation,eliquis    Mixed hyperlipidemia    Hypothyroidism (acquired)    Essential hypertension    Chest pain in adult    Ventricular tachycardia, non-sustained    Hyponatremia     Past Medical History:   Diagnosis Date    Abnormal ECG     Arrhythmia     Atrial fibrillation     Bowel trouble     Diabetes mellitus     Hyperlipidemia     Hypertension     Hypothyroid     Rheumatoid aortitis      Past Surgical History:   Procedure Laterality Date    CARDIAC ABLATION      CATHETER ATRIAL SUPRAVENTRICULAR TACHYCARDIA    CARDIAC CATHETERIZATION      CARPAL TUNNEL RELEASE      COLECTOMY PARTIAL / TOTAL      HERNIA REPAIR      KNEE ARTHROSCOPY      BOTH KNEES     PT Assessment (last 12 hours)       PT Evaluation and Treatment       Row Name 23 1308          Physical Therapy Time and Intention    Subjective Information no complaints  -AG     Document Type therapy note (daily note)  -AG     Mode of Treatment physical therapy  -AG     Patient Effort good  -AG     Symptoms Noted During/After Treatment fatigue  -AG       Row Name 23 1308          General Information    Patient Profile Reviewed yes  -AG     Patient Observations agree to therapy;cooperative;alert  -AG     Patient/Family/Caregiver Comments/Observations pt. supine in PCU bed; 3L O2, telemetry in place.  Pt. agreeable to participation.  -AG     Existing Precautions/Restrictions fall;oxygen therapy device and L/min  -AG     Risks Reviewed patient:;LOB;dizziness  -AG     Benefits  Reviewed patient:;improve function;increase independence;increase strength;increase balance;increase knowledge;decrease risk of DVT  -AG     Barriers to Rehab medically complex  -       Row Name 09/06/23 1308          Living Environment    Primary Care Provided by self  -       Row Name 09/06/23 1308          Pain    Pretreatment Pain Rating 0/10 - no pain  -AG     Posttreatment Pain Rating 0/10 - no pain  -AG       [facility] Name 09/06/23 1308          Cognition    Affect/Mental Status (Cognition) WFL  -     Orientation Status (Cognition) oriented x 3  -AG     Personal Safety Interventions fall prevention program maintained;gait belt;nonskid shoes/slippers when out of bed;supervised activity  -       Row Name 09/06/23 1308          Vision Assessment/Intervention    Visual Impairment/Limitations WFL  -[facility] Name 09/06/23 1308          Mobility    Extremity Weight-bearing Status --  no restrictions  -       Row Name 09/06/23 1308          Bed Mobility    Bed Mobility scooting/bridging;supine-sit;sit-supine  -AG     Supine-Sit [place] (Bed Mobility) standby assist  -AG     Sit-Supine [place] (Bed Mobility) standby assist  -AG     Assistive Device (Bed Mobility) bed rails  -       Row Name 09/06/23 1308          Transfers    Transfers sit-stand transfer;stand-sit transfer  -[facility] Name 09/06/23 1308          Sit-Stand Transfer    Sit-Stand [place] (Transfers) verbal cues;nonverbal cues (demo/gesture);contact guard  -AG     Assistive Device (Sit-Stand Transfers) walker, front-wheeled  -AG       Row Name 09/06/23 1308          Stand-Sit Transfer    Stand-Sit [place] (Transfers) verbal cues;nonverbal cues (demo/gesture);contact guard  -AG     Assistive Device (Stand-Sit Transfers) walker, front-wheeled  -AG       [facility] Name 09/06/23 1308          Gait/Stairs (Locomotion)    Gait/Stairs Locomotion gait/ambulation independence;gait/ambulation assistive device;distance ambulated;gait  pattern;gait deviations;maintains weight-bearing status  -     [place] Level (Gait) verbal cues;nonverbal cues (demo/gesture);contact guard  -AG     Assistive Device (Gait) walker, front-wheeled  -     Distance in Feet (Gait) pt. took 5 L lateral steps toward HOB w/ CGA  -AG       Row Name 09/06/23 1308          Safety Issues, Functional Mobility    Impairments Affecting Function (Mobility) balance;endurance/activity tolerance;strength  -AG       Row Name 09/06/23 1308          Balance    Balance Assessment sitting static balance;sitting dynamic balance;sit to stand dynamic balance;standing static balance;standing dynamic balance  -AG     Static Sitting Balance modified independence  -     Position, Sitting Balance unsupported;sitting edge of bed  -AG     Static Standing Balance verbal cues;non-verbal cues (demo/gesture);contact guard  -AG     Dynamic Standing Balance verbal cues;non-verbal cues (demo/gesture);contact guard;minimal assist  -AG     Position/Device Used, Standing Balance walker, front-wheeled  -AG       Row Name 09/06/23 1308          Motor Skills    Therapeutic Exercise hip;knee;ankle  -       Row Name 09/06/23 1308          Hip (Therapeutic Exercise)    Hip (Therapeutic Exercise) strengthening exercise  -     Hip Strengthening (Therapeutic Exercise) bilateral;flexion;extension  -AG       Row Name 09/06/23 1308          Knee (Therapeutic Exercise)    Knee (Therapeutic Exercise) strengthening exercise  -     Knee Strengthening (Therapeutic Exercise) bilateral;flexion;extension;marching while seated;marching while standing;LAQ (long arc quad);sitting;standing  -       Row Name 09/06/23 1308          Ankle (Therapeutic Exercise)    Ankle (Therapeutic Exercise) strengthening exercise  -     Ankle Strengthening (Therapeutic Exercise) bilateral;dorsiflexion;sitting;standing  -AG       Row Name 09/06/23 1308          Coping    Observed Emotional State calm;cooperative  -      Verbalized Emotional State acceptance  -AG     Trust Relationship/Rapport care explained;choices provided;thoughts/feelings acknowledged  -AG     Family/Support Persons spouse  -AG     Involvement in Care not present at bedside  -AG     Family/Support System Care support provided;self-care encouraged  -AG       Row Name 09/06/23 1308          Plan of Care Review    Plan of Care Reviewed With patient  -AG     Outcome Evaluation PT treatment session complete.  Pt. SBA for supine<>sit, STS w/ RW and CGA, able to performed sitting, standing B LE TE for strengthening, endurance; able to take ~5 L lateral steps.  Will continue to follow.  -AG       Row Name 09/06/23 1308          Vital Signs    Pre Systolic BP Rehab 121  -AG     Pre Treatment Diastolic BP 71  -AG     Intra Systolic BP Rehab 127  -AG     Intra Treatment Diastolic   -AG     Pretreatment Heart Rate (beats/min) 80  -AG     Intratreatment Heart Rate (beats/min) 93  -AG     Post SpO2 (%) 97  -AG     O2 Delivery Post Treatment supplemental O2  -AG     Pre Patient Position Supine  -AG     Intra Patient Position Standing  -AG     Post Patient Position Sitting  -AG       Row Name 09/06/23 1308          Positioning and Restraints    Pre-Treatment Position in bed  -AG     Post Treatment Position bed  -AG     In Bed fowlers;call light within reach;encouraged to call for assist;exit alarm on;side rails up x3;with nsg;heels elevated  -AG       Row Name 09/06/23 1309          Therapy Plan Review/Discharge Plan (PT)    Therapy Plan Review (PT) evaluation/treatment results reviewed;care plan/treatment goals reviewed;risks/benefits reviewed;current/potential barriers reviewed;participants voiced agreement with care plan;participants included;patient  -AG               User Key  (r) = Recorded By, (t) = Taken By, (c) = Cosigned By      Initials Name Provider Type    Cristina Dolan, PT Physical Therapist                    Physical Therapy Education       Title: PT  OT SLP Therapies (Done)       Topic: Physical Therapy (Done)       Point: Mobility training (Done)       Learning Progress Summary             Patient Acceptance, E,D, VU,NR by AG at 9/6/2023 1257    Acceptance, E, VU,NR by  at 9/5/2023 1923    Acceptance, E,TB, VU by  at 9/5/2023 1747    Acceptance, E,TB, VU by  at 9/5/2023 0927                         Point: Home exercise program (Done)       Learning Progress Summary             Patient Acceptance, E,D, VU,NR by AG at 9/6/2023 1257    Acceptance, E, VU,NR by LS at 9/5/2023 1923    Acceptance, E,TB, VU by  at 9/5/2023 1747    Acceptance, E,TB, VU by  at 9/5/2023 0927                         Point: Body mechanics (Done)       Learning Progress Summary             Patient Acceptance, E,D, VU,NR by AG at 9/6/2023 1257    Acceptance, E, VU,NR by  at 9/5/2023 1923    Acceptance, E,TB, VU by  at 9/5/2023 1747    Acceptance, E,TB, VU by  at 9/5/2023 0927                         Point: Precautions (Done)       Learning Progress Summary             Patient Acceptance, E,D, VU,NR by  at 9/6/2023 1257    Acceptance, E, VU,NR by  at 9/5/2023 1923    Acceptance, E,TB, VU by  at 9/5/2023 1747    Acceptance, E,TB, VU by  at 9/5/2023 0927                                         User Key       Initials Effective Dates Name Provider Type Discipline     06/16/21 -  Cristina Corona, PT Physical Therapist PT     06/16/21 -  Awa Shoemaker, RN Registered Nurse Nurse     05/31/23 -  Elie Sommers, PT Physical Therapist PT     06/09/23 -  Deandra Reilly, RN Registered Nurse Nurse                  PT Recommendation and Plan     Plan of Care Reviewed With: patient  Outcome Evaluation: PT treatment session complete.  Pt. SBA for supine<>sit, STS w/ RW and CGA, able to performed sitting, standing B LE TE for strengthening, endurance; able to take ~5 L lateral steps.  Will continue to follow.       Time Calculation:    PT Charges       Row Name 09/06/23 2758              Time Calculation    PT Received On 09/06/23  -                User Key  (r) = Recorded By, (t) = Taken By, (c) = Cosigned By      Initials Name Provider Type    Cristina Dolan, PT Physical Therapist                  Therapy Charges for Today       Code Description Service Date Service Provider Modifiers Qty    [id number]  PT THER PROC EA 15 MIN 9/6/2023 Cristina Corona, PT GP 1    [id number]  PT THERAPEUTIC ACT EA 15 MIN 9/6/2023 Cristina Corona, PT GP 1            PT G-Codes  AM-PAC 6 Clicks Score (PT): 9    Cristina Corona, PT  9/6/2023

## 2023-09-06 NOTE — PROGRESS NOTES
Nephrology Progress Note      Subjective     Patient denies any chest pain, shortness of breath.  No headache no visual changes    Objective       Vital signs :     Temp:  [97.8 °F (36.6 °C)-99.3 °F (37.4 °C)] 99.3 °F (37.4 °C)  Heart Rate:  [] 79  Resp:  [11-30] 16  BP: ()/() 129/64    Intake/Output                         09/04/23 0701 - 09/05/23 0700 09/05/23 0701 - 09/06/23 0700     4698-0337 8746-4239 Total 7009-6943 8217-4447 Total                 Intake    P.O.  --  1000 1000  120  622 742    I.V.  702.3  932.7 1634.9  60  358.3 418.3    IV Piggyback  --  100 100  --  -- --    Total Intake 702.3 2032.7 2734.9 180 980.3 1160.3       Output    Urine  175  850 1025  1100  900 2000    Total Output  2858 812 9543             Physical Exam:    General Appearance : alert, pleasant, appears stated age, cooperative and alert  Lungs : clear to auscultation, respirations regular  Heart :  regular rhythm & normal rate, normal S1, S2 and no murmur, no rub  Abdomen : soft, non distended  Extremities : No edema,   Neurologic :   orientated to person, place, time and situation, Grossly no focal deficits        Laboratory Data :     Albumin Albumin   Date Value Ref Range Status   09/06/2023 2.6 (L) 3.5 - 5.2 g/dL Final   09/04/2023 3.6 3.5 - 5.2 g/dL Final      Magnesium No results found for: MG       PTH               No results found for: PTH    CBC and coagulation:  Results from last 7 days   Lab Units 09/06/23  0015 09/04/23  2238 09/04/23  1046   WBC 10*3/mm3 10.12 13.02* 16.24*   HEMOGLOBIN g/dL 13.7 13.7 16.5   HEMATOCRIT % 41.0 40.4 46.9   MCV fL 99.8* 98.5* 96.7   MCHC g/dL 33.4 33.9 35.2   PLATELETS 10*3/mm3 112* 117* 136*     Acid/base balance:      Renal and electrolytes:    Results from last 7 days   Lab Units 09/06/23  0015 09/05/23  1146 09/05/23  0648 09/05/23  0435 09/05/23  0203   SODIUM mmol/L 122*  122* 120* 121* 121* 120*   POTASSIUM mmol/L 3.9  3.9 4.0 3.8 3.9 3.9    CHLORIDE mmol/L 96*  96* 96* 94* 93* 92*   CO2 mmol/L 18.1*  18.1* 17.1* 17.7* 18.8* 17.2*   BUN mg/dL 16  16 18 20 22 24*   CREATININE mg/dL 0.80  0.80 0.76 0.85 0.88 0.91   CALCIUM mg/dL 6.9*  6.9* 6.8* 6.7* 6.8* 6.7*     Estimated Creatinine Clearance: 93.1 mL/min (by C-G formula based on SCr of 0.8 mg/dL).  @GFRCG:3@   Liver and pancreatic function:  Results from last 7 days   Lab Units 09/06/23  0015 09/04/23  1046   ALBUMIN g/dL 2.6* 3.6   BILIRUBIN mg/dL 0.7 0.9   ALK PHOS U/L 45 63   AST (SGOT) U/L 106* 146*   ALT (SGPT) U/L 73* 83*         Cardiac:      Liver and pancreatic function:  Results from last 7 days   Lab Units 09/06/23  0015 09/04/23  1046   ALBUMIN g/dL 2.6* 3.6   BILIRUBIN mg/dL 0.7 0.9   ALK PHOS U/L 45 63   AST (SGOT) U/L 106* 146*   ALT (SGPT) U/L 73* 83*       Medications :     aspirin, 81 mg, Oral, Daily  folic acid, 1,000 mcg, Oral, Daily  heparin (porcine), 5,000 Units, Subcutaneous, Q8H  insulin lispro, 2-7 Units, Subcutaneous, 4x Daily AC & at Bedtime  levothyroxine, 75 mcg, Oral, Q AM  metoprolol tartrate, 75 mg, Oral, Q12H  pantoprazole, 40 mg, Oral, QAM AC  senna-docusate sodium, 2 tablet, Oral, BID  sertraline, 100 mg, Oral, Daily  sodium chloride, 10 mL, Intravenous, Q12H  sodium chloride, 10 mL, Intravenous, Q12H  sulfaSALAzine, 500 mg, Oral, BID      dilTIAZem, 5-15 mg/hr, Last Rate: Stopped (09/06/23 0733)          Assessment & Plan     -KAL, resolved  - Presumed hypotonic presumed chronic hyponatremia  - Hypothyroidism  - Atrial fibrillation  - Essential hypertension  - Type 2 diabetes mellitus     Urine osmolality, urine sodium is highly suggestive of SIADH and likely etiology is drug-induced.  No improvement in sodium on fluid restriction, received 3% saline twice in last 24 hours.  We will start the patient on salt tablets 3 g 3 times daily.  Check the BMP at 10:00 and then decide further management for rest of the day    Hyponatremia likely multifactorial including  HCTZ, polydipsia and SIADH, nausea induced.  Admitted with 119      Shell Edgar MD  09/06/23  08:39 EDT

## 2023-09-06 NOTE — THERAPY TREATMENT NOTE
Patient Name: Clinton Hernandez  : 1950    MRN: [id number]                              Today's Date: 2023       Admit Date: 2023    Visit Dx:     ICD-10-CM ICD-9-CM   1. Atrial fibrillation with rapid ventricular response  I48.91 427.31   2. Hyponatremia  E87.1 276.1   3. Acute kidney injury  N17.9 584.9     Patient Active Problem List   Diagnosis    Paroxysmal atrial fibrillation    Chronic anticoagulation,eliquis    Mixed hyperlipidemia    Hypothyroidism (acquired)    Essential hypertension    Chest pain in adult    Ventricular tachycardia, non-sustained    Hyponatremia     Past Medical History:   Diagnosis Date    Abnormal ECG     Arrhythmia     Atrial fibrillation     Bowel trouble     Diabetes mellitus     Hyperlipidemia     Hypertension     Hypothyroid     Rheumatoid aortitis      Past Surgical History:   Procedure Laterality Date    CARDIAC ABLATION      CATHETER ATRIAL SUPRAVENTRICULAR TACHYCARDIA    CARDIAC CATHETERIZATION      CARPAL TUNNEL RELEASE      COLECTOMY PARTIAL / TOTAL      HERNIA REPAIR      KNEE ARTHROSCOPY      BOTH KNEES      General Information       Row Name 23 1516          OT Time and Intention    Document Type therapy note (daily note)  -     Mode of Treatment individual therapy;occupational therapy  -       Row Name 23 1516          General Information    Patient Profile Reviewed yes  -     Existing Precautions/Restrictions fall;oxygen therapy device and L/min  -     Barriers to Rehab medically complex  -       Row Name 23 1516          Cognition    Orientation Status (Cognition) oriented x 3  -       Row Name 23 1516          Safety Issues, Functional Mobility    Impairments Affecting Function (Mobility) balance;endurance/activity tolerance;strength  -               User Key  (r) = Recorded By, (t) = Taken By, (c) = Cosigned By      Initials Name Provider Type    Maryanne Patricia, OT Occupational Therapist                      Mobility/ADL's       Row Name 09/06/23 1520          Bed Mobility    Bed Mobility supine-sit;sit-supine  -KP     Supine-Sit [place] (Bed Mobility) minimum assist (75% patient effort)  -     Sit-Supine [place] (Bed Mobility) minimum assist (75% patient effort)  -     Assistive Device (Bed Mobility) bed rails;head of bed elevated  -       Row Name 09/06/23 1520          Transfers    Transfers sit-stand transfer;stand-sit transfer  -     Comment, (Transfers) initial sit to stand moderate assist; consecutive sit to stands X5 reps with minimal assist using bed rail  -               User Key  (r) = Recorded By, (t) = Taken By, (c) = Cosigned By      Initials Name Provider Type    Maryanne Patricia OT Occupational Therapist                   Obj/Interventions       Row Name 09/06/23 1521          Motor Skills    Motor Skills functional endurance  -     Functional Endurance fair  -               User Key  (r) = Recorded By, (t) = Taken By, (c) = Cosigned By      Initials Name Provider Type    Maryanne Patricia, OT Occupational Therapist                   Goals/Plan    No documentation.                  Clinical Impression       Row Name 09/06/23 1522          Pain Assessment    Pretreatment Pain Rating 0/10 - no pain  -     Posttreatment Pain Rating 0/10 - no pain  -       Row Name 09/06/23 1522          Plan of Care Review    Plan of Care Reviewed With patient  -     Progress improving  -     Outcome Evaluation Patient seen for OT treatment. He required increased assistance with initial sit to stand on this date, but improved with next trials to minimal assist. Continue plan of care.  -       Row Name 09/06/23 1522          Positioning and Restraints    Pre-Treatment Position in bed  -     Post Treatment Position bed  -KP     In Bed fowlers;call light within reach;encouraged to call for assist;exit alarm on  -               User Key  (r) = Recorded By, (t) = Taken By, (c) =  Cosigned By      Initials Name Provider Type    Maryanne Patricia OT Occupational Therapist                   Outcome Measures       Row Name 09/06/23 1439 09/06/23 0800       How much help from another person do you currently need...    Turning from your back to your side while in flat bed without using bedrails? 3  -AJ 3  -AJ    Moving from lying on back to sitting on the side of a flat bed without bedrails? 2  -AJ 2  -AJ    Moving to and from a bed to a chair (including a wheelchair)? 1  -AJ 1  -AJ    Standing up from a chair using your arms (e.g., wheelchair, bedside chair)? 1  -AJ 1  -AJ    Climbing 3-5 steps with a railing? 1  -AJ 1  -AJ    To walk in hospital room? 1  -AJ 1  -AJ    AM-PAC 6 Clicks Score (PT) 9  -AJ 9  -AJ    Highest level of mobility 3 --> Sat at edge of bed  -AJ 3 --> Sat at edge of bed  -AJ              User Key  (r) = Recorded By, (t) = Taken By, (c) = Cosigned By      Initials Name Provider Type    Pari Pearson, RN Registered Nurse                      OT Recommendation and Plan     Plan of Care Review  Plan of Care Reviewed With: patient  Progress: improving  Outcome Evaluation: Patient seen for OT treatment. He required increased assistance with initial sit to stand on this date, but improved with next trials to minimal assist. Continue plan of care.     Time Calculation:         Time Calculation- OT       Row Name 09/06/23 1524             Time Calculation- OT    OT Received On 09/06/23  -                User Key  (r) = Recorded By, (t) = Taken By, (c) = Cosigned By      Initials Name Provider Type    Maryanne Patricia OT Occupational Therapist                  Therapy Charges for Today       Code Description Service Date Service Provider Modifiers Qty    27398257007  OT THERAPEUTIC ACT EA 15 MIN 9/6/2023 Maryanne Champion OT GO 1                 Maryanne Champion OT  9/6/2023

## 2023-09-06 NOTE — CASE MANAGEMENT/SOCIAL WORK
Discharge Planning Assessment   [facility]     Patient Name: Clinton Hernandez  MRN: [id number]  Today's Date: 9/6/2023    Admit Date: 9/4/2023     Discharge Plan       Row Name 09/06/23 1451       Plan    Plan Pt lives with spouse Edith and plans to return back home at discharge. Pt does not utile  services at this time. Pt PCP Caroline Fernando. Pt states he has a cpap but does not utilize his cpap. Pt states he has POA and living will, but neither is on file. [facility] Pt ambulated with PT on this date. SS to follow and assist with discharge planning.               RUPINDER WatsonW

## 2023-09-07 VITALS
DIASTOLIC BLOOD PRESSURE: 73 MMHG | HEIGHT: 67 IN | WEIGHT: 217.59 LBS | OXYGEN SATURATION: 96 % | SYSTOLIC BLOOD PRESSURE: 124 MMHG | TEMPERATURE: 98.2 F | BODY MASS INDEX: 34.15 KG/M2 | HEART RATE: 76 BPM | RESPIRATION RATE: 20 BRPM

## 2023-09-07 LAB
ANION GAP SERPL CALCULATED.3IONS-SCNC: 6.3 MMOL/L (ref 5–15)
BUN SERPL-MCNC: 15 MG/DL (ref 8–23)
BUN/CREAT SERPL: 19.7 (ref 7–25)
CALCIUM SPEC-SCNC: 7.1 MG/DL (ref 8.6–10.5)
CHLORIDE SERPL-SCNC: 103 MMOL/L (ref 98–107)
CO2 SERPL-SCNC: 19.7 MMOL/L (ref 22–29)
CREAT SERPL-MCNC: 0.76 MG/DL (ref 0.76–1.27)
EGFRCR SERPLBLD CKD-EPI 2021: 95.5 ML/MIN/1.73
GLUCOSE BLDC GLUCOMTR-MCNC: 100 MG/DL (ref 70–130)
GLUCOSE BLDC GLUCOMTR-MCNC: 103 MG/DL (ref 70–130)
GLUCOSE SERPL-MCNC: 122 MG/DL (ref 65–99)
POTASSIUM SERPL-SCNC: 4 MMOL/L (ref 3.5–5.2)
QT INTERVAL: 280 MS
QT INTERVAL: 322 MS
QT INTERVAL: 328 MS
QTC INTERVAL: 433 MS
QTC INTERVAL: 484 MS
QTC INTERVAL: 499 MS
SODIUM SERPL-SCNC: 129 MMOL/L (ref 136–145)
SODIUM SERPL-SCNC: 129 MMOL/L (ref 136–145)
SODIUM SERPL-SCNC: 130 MMOL/L (ref 136–145)

## 2023-09-07 PROCEDURE — 84295 ASSAY OF SERUM SODIUM: CPT | Performed by: INTERNAL MEDICINE

## 2023-09-07 PROCEDURE — 82948 REAGENT STRIP/BLOOD GLUCOSE: CPT

## 2023-09-07 PROCEDURE — 80048 BASIC METABOLIC PNL TOTAL CA: CPT | Performed by: INTERNAL MEDICINE

## 2023-09-07 PROCEDURE — 94761 N-INVAS EAR/PLS OXIMETRY MLT: CPT

## 2023-09-07 PROCEDURE — 25010000002 HEPARIN (PORCINE) PER 1000 UNITS: Performed by: INTERNAL MEDICINE

## 2023-09-07 PROCEDURE — 97116 GAIT TRAINING THERAPY: CPT

## 2023-09-07 PROCEDURE — 94799 UNLISTED PULMONARY SVC/PX: CPT

## 2023-09-07 RX ORDER — SODIUM CHLORIDE 1 G/1
2 TABLET ORAL
Status: DISCONTINUED | OUTPATIENT
Start: 2023-09-07 | End: 2023-09-07 | Stop reason: HOSPADM

## 2023-09-07 RX ORDER — SODIUM CHLORIDE 1 G/1
2 TABLET ORAL
Qty: 180 TABLET | Refills: 0 | Status: SHIPPED | OUTPATIENT
Start: 2023-09-07 | End: 2023-10-07

## 2023-09-07 RX ORDER — METOPROLOL TARTRATE 75 MG/1
75 TABLET, FILM COATED ORAL EVERY 12 HOURS SCHEDULED
Qty: 60 TABLET | Refills: 0 | Status: SHIPPED | OUTPATIENT
Start: 2023-09-07 | End: 2023-10-07

## 2023-09-07 RX ADMIN — SULFASALAZINE 500 MG: 500 TABLET ORAL at 08:48

## 2023-09-07 RX ADMIN — HEPARIN SODIUM 5000 UNITS: 5000 INJECTION INTRAVENOUS; SUBCUTANEOUS at 14:26

## 2023-09-07 RX ADMIN — Medication 10 ML: at 08:50

## 2023-09-07 RX ADMIN — PANTOPRAZOLE SODIUM 40 MG: 40 TABLET, DELAYED RELEASE ORAL at 06:30

## 2023-09-07 RX ADMIN — ASPIRIN 81 MG: 81 TABLET, CHEWABLE ORAL at 08:53

## 2023-09-07 RX ADMIN — SERTRALINE 100 MG: 50 TABLET, FILM COATED ORAL at 08:48

## 2023-09-07 RX ADMIN — METOPROLOL TARTRATE 75 MG: 50 TABLET, FILM COATED ORAL at 08:48

## 2023-09-07 RX ADMIN — SODIUM CHLORIDE TAB 1 GM 2 G: 1 TAB at 08:51

## 2023-09-07 RX ADMIN — LEVOTHYROXINE SODIUM 75 MCG: 0.07 TABLET ORAL at 06:11

## 2023-09-07 RX ADMIN — SODIUM CHLORIDE TAB 1 GM 2 G: 1 TAB at 11:23

## 2023-09-07 RX ADMIN — Medication 1000 MCG: at 08:49

## 2023-09-07 RX ADMIN — HEPARIN SODIUM 5000 UNITS: 5000 INJECTION INTRAVENOUS; SUBCUTANEOUS at 06:11

## 2023-09-07 RX ADMIN — DOCUSATE SODIUM 50 MG AND SENNOSIDES 8.6 MG 2 TABLET: 8.6; 5 TABLET, FILM COATED ORAL at 08:48

## 2023-09-07 NOTE — PROGRESS NOTES
Nephrology Progress Note      Subjective     Doing well today no chest pain, shortness of breath    Objective       Vital signs :     Temp:  [98.2 °F (36.8 °C)-98.7 °F (37.1 °C)] 98.2 °F (36.8 °C)  Heart Rate:  [60-90] 60  Resp:  [13-26] 16  BP: (105-139)/(60-92) 132/80    Intake/Output                         09/05/23 0701 - 09/06/23 0700 09/06/23 0701 - 09/07/23 0700     3335-3443 8391-2062 Total 3445-6128 2262-2420 Total                 Intake    P.O.  120  622 742  882  722 1604    I.V.  60  358.3 418.3  --  0 0    Total Intake 180 980.3 1160.3        Output    Urine  1100  900 2000  950  1100 2050    Total Output 4154 399 2371 950 1100 2050             Physical Exam:    General Appearance : alert, pleasant, appears stated age, cooperative and alert  Lungs : clear to auscultation, respirations regular  Heart :  regular rhythm & normal rate, normal S1, S2 and no murmur, no rub  Abdomen : soft, non distended  Extremities : No edema,   Neurologic :   orientated to person, place, time and situation, Grossly no focal deficits        Laboratory Data :     Albumin Albumin   Date Value Ref Range Status   09/06/2023 2.6 (L) 3.5 - 5.2 g/dL Final   09/04/2023 3.6 3.5 - 5.2 g/dL Final      Magnesium No results found for: MG       PTH               No results found for: PTH    CBC and coagulation:  Results from last 7 days   Lab Units 09/06/23  0015 09/04/23 2238 09/04/23  1046   WBC 10*3/mm3 10.12 13.02* 16.24*   HEMOGLOBIN g/dL 13.7 13.7 16.5   HEMATOCRIT % 41.0 40.4 46.9   MCV fL 99.8* 98.5* 96.7   MCHC g/dL 33.4 33.9 35.2   PLATELETS 10*3/mm3 112* 117* 136*     Acid/base balance:      Renal and electrolytes:    Results from last 7 days   Lab Units 09/07/23  0535 09/06/23 2053 09/06/23  1607 09/06/23  1026 09/06/23  0848 09/06/23  0015 09/05/23  1146   SODIUM mmol/L 129* 126* 128* 125* 125* 122*  122* 120*   POTASSIUM mmol/L  --   --  4.0 4.2 3.8 3.9  3.9 4.0   CHLORIDE mmol/L  --   --  100 97* 98 96*   96* 96*   CO2 mmol/L  --   --  20.6* 19.9* 19.7* 18.1*  18.1* 17.1*   BUN mg/dL  --   --  17 17 16 16  16 18   CREATININE mg/dL  --   --  0.76 0.83 0.82 0.80  0.80 0.76   CALCIUM mg/dL  --   --  7.1* 7.2* 7.0* 6.9*  6.9* 6.8*     Estimated Creatinine Clearance: 98.3 mL/min (by C-G formula based on SCr of 0.76 mg/dL).  @GFRCG:3@   Liver and pancreatic function:  Results from last 7 days   Lab Units 09/06/23  0015 09/04/23  1046   ALBUMIN g/dL 2.6* 3.6   BILIRUBIN mg/dL 0.7 0.9   ALK PHOS U/L 45 63   AST (SGOT) U/L 106* 146*   ALT (SGPT) U/L 73* 83*         Cardiac:      Liver and pancreatic function:  Results from last 7 days   Lab Units 09/06/23  0015 09/04/23  1046   ALBUMIN g/dL 2.6* 3.6   BILIRUBIN mg/dL 0.7 0.9   ALK PHOS U/L 45 63   AST (SGOT) U/L 106* 146*   ALT (SGPT) U/L 73* 83*       Medications :     aspirin, 81 mg, Oral, Daily  folic acid, 1,000 mcg, Oral, Daily  heparin (porcine), 5,000 Units, Subcutaneous, Q8H  insulin lispro, 2-7 Units, Subcutaneous, 4x Daily AC & at Bedtime  levothyroxine, 75 mcg, Oral, Q AM  metoprolol tartrate, 75 mg, Oral, Q12H  pantoprazole, 40 mg, Oral, QAM AC  senna-docusate sodium, 2 tablet, Oral, BID  sertraline, 100 mg, Oral, Daily  sodium chloride, 10 mL, Intravenous, Q12H  sodium chloride, 10 mL, Intravenous, Q12H  sodium chloride, 2 g, Oral, BID With Meals  sulfaSALAzine, 500 mg, Oral, BID      dilTIAZem, 5-15 mg/hr, Last Rate: Stopped (09/06/23 0733)          Assessment & Plan     -KAL, resolved  - Presumed hypotonic presumed chronic hyponatremia  - Hypothyroidism  - Atrial fibrillation  - Essential hypertension  - Type 2 diabetes mellitus     Adequate response with oral salt tablet, sodium has appropriately improved.  Will increase salt tablet dose to 3 g 3 times daily.  Recheck BMP at 2 PM and then decide.  Patient should be able to go home either by end of the day today or tomorrow morning  urine osmolality, urine sodium is highly suggestive of SIADH and likely  etiology is drug-induced.    Hyponatremia likely multifactorial including HCTZ, polydipsia and SIADH, nausea induced.  Admitted with 119      Shell Edgar MD  09/07/23  08:11 EDT

## 2023-09-07 NOTE — DISCHARGE INSTRUCTIONS
Please take medications as prescribed. Please go to follow-up appointments as recommended. Please seek medical attention if you have worsening of any symptoms.

## 2023-09-07 NOTE — PLAN OF CARE
Goal Outcome Evaluation:  Plan of Care Reviewed With: patient        Progress: no change  Outcome Evaluation: Pt remains A+Ox4. VSS, afebrile, on RA. Pt is not adhering to his fluid restriction. Educated him on why he should follow it and the risks of not, he still had his family fill up his water jug from the sink and had them bring him soda. Na+ came back 126. Bed in lowest position, alarm on. Call light in reach. Pt denies any requests at this time.

## 2023-09-07 NOTE — PLAN OF CARE
Goal Outcome Evaluation:           Progress: improving  Outcome Evaluation: Pt VSS on room air. Alert and oriented x4. Ambulated in hook with physical therapy and tolerated well. PO Sodium replacement trending results. Patient has been up to the restroom multiple times today, showing signs of improvement. Bed low, locked, and in posistion. Call light in reach.

## 2023-09-07 NOTE — THERAPY TREATMENT NOTE
Acute Care - Physical Therapy Treatment Note   [facility]     Patient Name: Clinton Hernandez  : 1950  MRN: [id number]  Today's Date: 2023   Onset of Illness/Injury or Date of Surgery: 23  Visit Dx:     ICD-10-CM ICD-9-CM   1. Atrial fibrillation with rapid ventricular response  I48.91 427.31   2. Hyponatremia  E87.1 276.1   3. Acute kidney injury  N17.9 584.9     Patient Active Problem List   Diagnosis    Paroxysmal atrial fibrillation    Chronic anticoagulation,eliquis    Mixed hyperlipidemia    Hypothyroidism (acquired)    Essential hypertension    Chest pain in adult    Ventricular tachycardia, non-sustained    Hyponatremia     Past Medical History:   Diagnosis Date    Abnormal ECG     Arrhythmia     Atrial fibrillation     Bowel trouble     Diabetes mellitus     Hyperlipidemia     Hypertension     Hypothyroid     Rheumatoid aortitis      Past Surgical History:   Procedure Laterality Date    CARDIAC ABLATION      CATHETER ATRIAL SUPRAVENTRICULAR TACHYCARDIA    CARDIAC CATHETERIZATION      CARPAL TUNNEL RELEASE      COLECTOMY PARTIAL / TOTAL      HERNIA REPAIR      KNEE ARTHROSCOPY      BOTH KNEES     PT Assessment (last 12 hours)       PT Evaluation and Treatment       Row Name 23 1108          Physical Therapy Time and Intention    Document Type therapy note (daily note)  -CS     Mode of Treatment physical therapy  -CS     Patient Effort good  -CS     Comment Pt seen bedside this AM. Pt agreed to PT.  -CS       Row Name 23 1108          General Information    Patient Profile Reviewed yes  -CS     Existing Precautions/Restrictions fall;oxygen therapy device and L/min  -CS       Row Name 23 1108          Living Environment    Primary Care Provided by self  -CS       Row Name 23 1108          Pain    Pretreatment Pain Rating 0/10 - no pain  -CS     Posttreatment Pain Rating 0/10 - no pain  -CS       Row Name 23 1108          Cognition    Affect/Mental Status  (Cognition) [facility]  -     Orientation Status (Cognition) oriented x 3  -       Row Name 09/07/23 1108          Vision Assessment/Intervention    Visual Impairment/Limitations WFL  -       Row Name 09/07/23 1108          Mobility    Extremity Weight-bearing Status --  no restrictions  -       Row Name 09/07/23 1108          Bed Mobility    Bed Mobility scooting/bridging;supine-sit;sit-supine  -     Supine-Sit [place] (Bed Mobility) standby assist  -     Sit-Supine [place] (Bed Mobility) standby assist  -     Assistive Device (Bed Mobility) bed rails  -       Row Name 09/07/23 1108          Transfers    Transfers sit-stand transfer;stand-sit transfer  -       Row Name 09/07/23 1108          Sit-Stand Transfer    Sit-Stand [place] (Transfers) verbal cues;nonverbal cues (demo/gesture);contact guard  -     Assistive Device (Sit-Stand Transfers) walker, front-wheeled  -       Row Name 09/07/23 1108          Stand-Sit Transfer    Stand-Sit [place] (Transfers) verbal cues;nonverbal cues (demo/gesture);contact guard  -     Assistive Device (Stand-Sit Transfers) walker, front-wheeled  -       Row Name 09/07/23 1108          Gait/Stairs (Locomotion)    Gait/Stairs Locomotion gait/ambulation independence;gait/ambulation assistive device;distance ambulated;gait pattern;gait deviations;maintains weight-bearing status  -     [place] Level (Gait) verbal cues;nonverbal cues (demo/gesture);contact guard  -     Assistive Device (Gait) walker, front-wheeled  -     Patient was able to Ambulate yes  -CS     Distance in Feet (Gait) 100  -CS     Pattern (Gait) step-through  -CS     Comment, (Gait/Stairs) Pt w/ slow cautious gait  -       Row Name 09/07/23 1108          Safety Issues, Functional Mobility    Impairments Affecting Function (Mobility) balance;endurance/activity tolerance;strength  -       Row Name 09/07/23 1108          Coping    Observed Emotional State  calm;cooperative  -CS     Verbalized Emotional State acceptance  -CS     Family/Support Persons spouse  -CS     Involvement in Care not present at bedside  -CS       Row Name 09/07/23 1108          Plan of Care Review    Plan of Care Reviewed With patient  -CS     Progress improving  -CS     Outcome Evaluation Pt w/ much improved tolerance for ambulation. Pt would benefit from continued skilled PT.  -CS       Row Name 09/07/23 1108          Positioning and Restraints    Pre-Treatment Position in bed  -CS     Post Treatment Position bed  -CS     In Bed supine;call light within reach;encouraged to call for assist;exit alarm on  -CS       Row Name 09/07/23 1108          Progress Summary (PT)    Progress Toward Functional Goals (PT) progress toward functional goals is good  -CS     Daily Progress Summary (PT) Pt w/ much improved tolerance for ambulation. Pt would benefit from continued skilled PT.  -CS               User Key  (r) = Recorded By, (t) = Taken By, (c) = Cosigned By      Initials Name Provider Type    CS Elie Sommers, PT Physical Therapist                    Physical Therapy Education       Title: PT OT SLP Therapies (Done)       Topic: Physical Therapy (Done)       Point: Mobility training (Done)       Learning Progress Summary             Patient Acceptance, E, VU,NR by  at 9/6/2023 2134    Acceptance, E,D, VU,NR by  at 9/6/2023 1257    Acceptance, E, VU,NR by  at 9/5/2023 1923    Acceptance, E,TB, VU by  at 9/5/2023 1747    Acceptance, E,TB, VU by  at 9/5/2023 0927                         Point: Home exercise program (Done)       Learning Progress Summary             Patient Acceptance, E, VU,NR by  at 9/6/2023 2134    Acceptance, E,D, VU,NR by AG at 9/6/2023 1257    Acceptance, E, VU,NR by  at 9/5/2023 1923    Acceptance, E,TB, VU by  at 9/5/2023 1747    Acceptance, E,TB, VU by  at 9/5/2023 0927                         Point: Body mechanics (Done)       Learning Progress Summary              Patient Acceptance, E, VU,NR by LS at 9/6/2023 2134    Acceptance, E,D, VU,NR by AG at 9/6/2023 1257    Acceptance, E, VU,NR by  at 9/5/2023 1923    Acceptance, E,TB, VU by  at 9/5/2023 1747    Acceptance, E,TB, VU by  at 9/5/2023 0927                         Point: Precautions (Done)       Learning Progress Summary             Patient Acceptance, E, VU,NR by  at 9/6/2023 2134    Acceptance, E,D, VU,NR by AG at 9/6/2023 1257    Acceptance, E, VU,NR by  at 9/5/2023 1923    Acceptance, E,TB, VU by  at 9/5/2023 1747    Acceptance, E,TB, VU by  at 9/5/2023 0927                                         User Key       Initials Effective Dates Name Provider Type Discipline     06/16/21 -  Cristina Corona, PT Physical Therapist PT     06/16/21 -  Awa Shoemaker, RN Registered Nurse Nurse     05/31/23 -  Elie Sommers, PT Physical Therapist PT     06/09/23 -  Deandra Reilly RN Registered Nurse Nurse                  PT Recommendation and Plan  Anticipated Discharge Disposition (PT): home with home health, home  Planned Therapy Interventions (PT): balance training, bed mobility training, gait training, home exercise program, neuromuscular re-education, patient/family education, strengthening, transfer training  Therapy Frequency (PT): 2 times/wk (2-5x/week)  Progress Summary (PT)  Progress Toward Functional Goals (PT): progress toward functional goals is good  Daily Progress Summary (PT): Pt w/ much improved tolerance for ambulation. Pt would benefit from continued skilled PT.  Plan of Care Reviewed With: patient  Progress: improving  Outcome Evaluation: Pt w/ much improved tolerance for ambulation. Pt would benefit from continued skilled PT.       Time Calculation:    PT Charges       Row Name 09/07/23 1345             Time Calculation    Start Time 1100  -CS      PT Received On 09/07/23  -      PT Goal Re-Cert Due Date 09/19/23  -         Timed Charges    11089 - Gait Training Minutes  23  -CS          Total Minutes    Timed Charges Total Minutes 23  -CS       Total Minutes 23  -CS                User Key  (r) = Recorded By, (t) = Taken By, (c) = Cosigned By      Initials Name Provider Type    CS Elie Sommers, PT Physical Therapist                  Therapy Charges for Today       Code Description Service Date Service Provider Modifiers Qty    31450309855 HC GAIT TRAINING EA 15 MIN 9/7/2023 Elie Sommers, PT GP 2            PT G-Codes  AM-PAC 6 Clicks Score (PT): 9    Elie Sommers, PT  9/7/2023

## 2023-09-07 NOTE — DISCHARGE INSTR - APPOINTMENTS
Follow up with PCP on September 14, @ 11:30 AM     Follow up with Nephrology on Septemeber 21, @ 11:45 AM   This appointment will be at the [place] office with Dr. Edgar.

## 2024-02-01 ENCOUNTER — OFFICE VISIT (OUTPATIENT)
Dept: CARDIOLOGY | Facility: CLINIC | Age: 74
End: 2024-02-01
Payer: MEDICARE

## 2024-02-01 VITALS
HEIGHT: 67 IN | SYSTOLIC BLOOD PRESSURE: 145 MMHG | HEART RATE: 72 BPM | OXYGEN SATURATION: 93 % | BODY MASS INDEX: 31.96 KG/M2 | DIASTOLIC BLOOD PRESSURE: 80 MMHG | WEIGHT: 203.6 LBS

## 2024-02-01 DIAGNOSIS — E78.2 MIXED HYPERLIPIDEMIA: ICD-10-CM

## 2024-02-01 DIAGNOSIS — R07.9 CHEST PAIN IN ADULT: Primary | ICD-10-CM

## 2024-02-01 DIAGNOSIS — Z79.01 CHRONIC ANTICOAGULATION: ICD-10-CM

## 2024-02-01 DIAGNOSIS — I10 ESSENTIAL HYPERTENSION: ICD-10-CM

## 2024-02-01 DIAGNOSIS — I48.0 PAROXYSMAL ATRIAL FIBRILLATION: ICD-10-CM

## 2024-02-01 RX ORDER — METOPROLOL SUCCINATE 100 MG/1
1 TABLET, EXTENDED RELEASE ORAL DAILY
COMMUNITY
Start: 2023-10-15

## 2024-02-01 NOTE — PROGRESS NOTES
subjective     Chief Complaint   Patient presents with    Follow-up     1 YEAR F/U       Problems Addressed This Visit  1. Chest pain in adult    2. Paroxysmal atrial fibrillation    3. Essential hypertension    4. Mixed hyperlipidemia    5. Chronic anticoagulation,eliquis        History of Present Illness    Patient is 73 years old white male who is here for 1 year follow-up from cardiology standpoint.  He states that almost 5 months ago on September 4, 2023 he was admitted to the hospital with atrial fibrillation rapid ventricular rate.  Apparently he was on vacation and complained of weakness fatigue and confusion.  He had an episode of fall and was unable to get on his feet and then family brought him to the emergency room    He was found to have atrial fibrillation with rapid ventricular rate  His sodium was very low around 119 and required normal saline and hydrochlorothiazide was held.  Patient was also given salt tabs  Troponin was elevated and patient was classified as non-STEMI type II.      He is doing very well at this time however he still complains of being weak and has mild chest discomfort off-and-on he is asymptomatic at this time.    Past Surgical History:   Procedure Laterality Date    CARDIAC ABLATION      CATHETER ATRIAL SUPRAVENTRICULAR TACHYCARDIA    CARDIAC CATHETERIZATION  1990    CARPAL TUNNEL RELEASE      COLECTOMY PARTIAL / TOTAL      HERNIA REPAIR      KNEE ARTHROSCOPY      BOTH KNEES     Family History   Problem Relation Age of Onset    Cancer Mother     Diabetes Father     Heart disease Father     Heart attack Father     Diabetes Other     Heart disease Other      Past Medical History:   Diagnosis Date    Abnormal ECG 2021    Arrhythmia 2022    Atrial fibrillation     Bowel trouble     Diabetes mellitus     Hyperlipidemia     Hypertension     Hypothyroid     Rheumatoid aortitis      Patient Active Problem List   Diagnosis    Paroxysmal atrial fibrillation    Chronic  anticoagulation,eliquis    Mixed hyperlipidemia    Hypothyroidism (acquired)    Essential hypertension    Chest pain in adult    Ventricular tachycardia, non-sustained    Hyponatremia       Social History     Tobacco Use    Smoking status: Former     Packs/day: 0.50     Years: 2.00     Additional pack years: 0.00     Total pack years: 1.00     Types: Cigarettes     Start date: 1975     Quit date: 1978     Years since quittin.1    Smokeless tobacco: Never   Vaping Use    Vaping Use: Never used   Substance Use Topics    Alcohol use: Not Currently     Comment: occasional    Drug use: Never       No Known Allergies    Current Outpatient Medications on File Prior to Visit   Medication Sig    aspirin 81 MG tablet Take 1 tablet by mouth Daily.    atorvastatin (LIPITOR) 20 MG tablet Take 1 tablet by mouth Every Night.    COENZYME Q-10 PO Take 1 capsule by mouth Daily.    Eliquis 5 MG tablet tablet Take 1 tablet by mouth Every 12 (Twelve) Hours.    levothyroxine (SYNTHROID, LEVOTHROID) 100 MCG tablet Take 1 tablet by mouth Daily.    metFORMIN ER (GLUCOPHAGE-XR) 500 MG 24 hr tablet Take 2 tablets by mouth Every Night.    metoprolol succinate XL (TOPROL-XL) 100 MG 24 hr tablet Take 1 tablet by mouth Daily.    pantoprazole (PROTONIX) 40 MG EC tablet Take 1 tablet by mouth Daily.    sertraline (ZOLOFT) 100 MG tablet Take 1 tablet by mouth Daily.    sulfaSALAzine (AZULFIDINE) 500 MG tablet Take 1 tablet by mouth 2 (Two) Times a Day.    zolpidem (AMBIEN) 10 MG tablet Take 1 tablet by mouth Every Night.    metoprolol tartrate 75 MG tablet Take 75 mg by mouth Every 12 (Twelve) Hours for 30 days.     No current facility-administered medications on file prior to visit.         The following portions of the patient's history were reviewed and updated as appropriate: allergies, current medications, past family history, past medical history, past social history, past surgical history and problem list.    Review of Systems  "  Constitutional: Negative.   HENT: Negative.  Negative for congestion.    Eyes: Negative.    Cardiovascular: Negative.  Negative for chest pain, cyanosis, dyspnea on exertion, irregular heartbeat, leg swelling, near-syncope, orthopnea, palpitations, paroxysmal nocturnal dyspnea and syncope.   Respiratory: Negative.  Negative for shortness of breath.    Hematologic/Lymphatic: Negative.    Musculoskeletal: Negative.    Gastrointestinal: Negative.    Neurological: Negative.  Negative for headaches.          Objective:     /80 (BP Location: Left arm, Patient Position: Sitting, Cuff Size: Large Adult)   Pulse 72   Ht 170.2 cm (67\")   Wt 92.4 kg (203 lb 9.6 oz)   SpO2 93%   BMI 31.89 kg/m²   Pulmonary:      Effort: Pulmonary effort is normal.      Breath sounds: Normal breath sounds. No stridor. No wheezing. No rhonchi. No rales.   Cardiovascular:      PMI at left midclavicular line. Normal rate. Irregular rhythm. Normal S1. Normal S2.       Murmurs: There is no murmur.      No gallop.  No click. No rub.   Pulses:     Intact distal pulses.   Edema:     Peripheral edema absent.           Lab Review  Lab Results   Component Value Date     (L) 09/07/2023    K 4.0 09/07/2023     09/07/2023    BUN 15 09/07/2023    CREATININE 0.76 09/07/2023    GLUCOSE 122 (H) 09/07/2023    CALCIUM 7.1 (L) 09/07/2023    ALT 73 (H) 09/06/2023    ALKPHOS 45 09/06/2023    LABIL2 1.4 (L) 04/15/2015     Lab Results   Component Value Date    CKTOTAL 1,067 (H) 09/05/2023     Lab Results   Component Value Date    WBC 10.12 09/06/2023    HGB 13.7 09/06/2023    HCT 41.0 09/06/2023     (L) 09/06/2023     Lab Results   Component Value Date    INR 1.99 04/27/2015     No results found for: \"MG\"  Lab Results   Component Value Date    TSH 3.530 09/04/2023     No results found for: \"BNP\"  Lab Results   Component Value Date    TRIG 217 (H) 09/05/2023     No results found for: \"LDL\"  No results found for: \"PROBNP\"              ECG " 12 Lead    Date/Time: 2/3/2024 3:55 PM  Performed by: Birdie Fernandes MD    Authorized by: Birdie Fernandes MD  Comparison: compared with previous ECG from 9/5/2023  Comparison to previous ECG: Atrial fibrillation with RVR is no longer noted.  Rhythm: sinus rhythm  Rate: normal  BPM: 65  Conduction: conduction normal  T inversion: II, III and aVF  QRS axis: normal  Other findings: non-specific ST-T wave changes    Clinical impression: abnormal EKG  Comments: Inferior wall T wave changes.  Inferior ischemia cannot be excluded             I personally viewed and interpreted the patient's LAB data         Assessment:     1. Chest pain in adult    2. Paroxysmal atrial fibrillation    3. Essential hypertension    4. Mixed hyperlipidemia    5. Chronic anticoagulation,eliquis          Plan:     Patient is 73 years old white male who has history of paroxysmal atrial fibrillation   He states that almost 5 months ago on September 4, 2023 he was admitted to the hospital with atrial fibrillation rapid ventricular rate.  Apparently he was on vacation and complained of weakness fatigue and confusion.  He had an episode of fall and was unable to get on his feet and then family brought him to the emergency room    He was found to have atrial fibrillation with rapid ventricular rate  His sodium was very low around 119 and required normal saline and hydrochlorothiazide was held.  Patient was also given salt tabs  Troponin was elevated and patient was classified as non-STEMI type II.    However no ischemic workup was done.  Patient has vague chest discomfort which is atypical  EKG shows inferior wall T wave changes will arrange Lexiscan stress test.  He has been more than 4 months since this episode.    Paroxysmal atrial fibrillation  Currently in sinus rhythm.  He is taking metoprolol  mg daily.  And is anticoagulated with Eliquis 5 twice daily    Hyperlipidemia on Lipitor 20 mg daily      No recent lab work  available is following closely with Dr. Fernando.  Will arrange a stress test for further evaluation.          No follow-ups on file.

## 2024-02-01 NOTE — LETTER
February 3, 2024     Caroline Fernando MD  [address] [place] [address]    Patient: Clinton Hernandez   YOB: 1950   Date of Visit: 2024       Dear Caroline Fernando MD    Clinton Hernandez was in my office today. Below is a copy of my note.    If you have questions, please do not hesitate to call me. I look forward to following Clinton along with you.         Sincerely,        Birdie Fernandes MD        CC: No Recipients    subjective     Chief Complaint   Patient presents with   • Follow-up     1 YEAR F/U       Problems Addressed This Visit  No diagnosis found.    History of Present Illness          Past Surgical History:   Procedure Laterality Date   • CARDIAC ABLATION      CATHETER ATRIAL SUPRAVENTRICULAR TACHYCARDIA   • CARDIAC CATHETERIZATION     • CARPAL TUNNEL RELEASE     • COLECTOMY PARTIAL / TOTAL     • HERNIA REPAIR     • KNEE ARTHROSCOPY      BOTH KNEES     Family History   Problem Relation Age of Onset   • Cancer Mother    • Diabetes Father    • Heart disease Father    • Heart attack Father    • Diabetes Other    • Heart disease Other      Past Medical History:   Diagnosis Date   • Abnormal ECG    • Arrhythmia    • Atrial fibrillation    • Bowel trouble    • Diabetes mellitus    • Hyperlipidemia    • Hypertension    • Hypothyroid    • Rheumatoid aortitis      Patient Active Problem List   Diagnosis   • Paroxysmal atrial fibrillation   • Chronic anticoagulation,eliquis   • Mixed hyperlipidemia   • Hypothyroidism (acquired)   • Essential hypertension   • Chest pain in adult   • Ventricular tachycardia, non-sustained   • Hyponatremia       Social History     Tobacco Use   • Smoking status: Former     Packs/day: 0.50     Years: 2.00     Additional pack years: 0.00     Total pack years: 1.00     Types: Cigarettes     Start date: 1975     Quit date: 1978     Years since quittin.1   • Smokeless tobacco: Never   Vaping Use   • Vaping Use: Never used   Substance Use  "Topics   • Alcohol use: Not Currently     Comment: occasional   • Drug use: Never       No Known Allergies    Current Outpatient Medications on File Prior to Visit   Medication Sig   • aspirin 81 MG tablet Take 1 tablet by mouth Daily.   • atorvastatin (LIPITOR) 20 MG tablet Take 1 tablet by mouth Every Night.   • COENZYME Q-10 PO Take 1 capsule by mouth Daily.   • Eliquis 5 MG tablet tablet Take 1 tablet by mouth Every 12 (Twelve) Hours.   • levothyroxine (SYNTHROID, LEVOTHROID) 100 MCG tablet Take 1 tablet by mouth Daily.   • metFORMIN ER (GLUCOPHAGE-XR) 500 MG 24 hr tablet Take 2 tablets by mouth Every Night.   • metoprolol succinate XL (TOPROL-XL) 100 MG 24 hr tablet Take 1 tablet by mouth Daily.   • pantoprazole (PROTONIX) 40 MG EC tablet Take 1 tablet by mouth Daily.   • sertraline (ZOLOFT) 100 MG tablet Take 1 tablet by mouth Daily.   • sulfaSALAzine (AZULFIDINE) 500 MG tablet Take 1 tablet by mouth 2 (Two) Times a Day.   • zolpidem (AMBIEN) 10 MG tablet Take 1 tablet by mouth Every Night.   • metoprolol tartrate 75 MG tablet Take 75 mg by mouth Every 12 (Twelve) Hours for 30 days.   • [DISCONTINUED] folic acid (FOLVITE) 1 MG tablet Take 1 tablet by mouth Daily. (Patient not taking: Reported on 2/1/2024)     No current facility-administered medications on file prior to visit.         The following portions of the patient's history were reviewed and updated as appropriate: allergies, current medications, past family history, past medical history, past social history, past surgical history and problem list.    ROS       Objective:     /80 (BP Location: Left arm, Patient Position: Sitting, Cuff Size: Large Adult)   Pulse 72   Ht 170.2 cm (67\")   Wt 92.4 kg (203 lb 9.6 oz)   SpO2 93%   BMI 31.89 kg/m²   Physical Exam      Lab Review  Lab Results   Component Value Date     (L) 09/07/2023    K 4.0 09/07/2023     09/07/2023    BUN 15 09/07/2023    CREATININE 0.76 09/07/2023    GLUCOSE 122 (H) " "09/07/2023    CALCIUM 7.1 (L) 09/07/2023    ALT 73 (H) 09/06/2023    ALKPHOS 45 09/06/2023    LABIL2 1.4 (L) 04/15/2015     Lab Results   Component Value Date    CKTOTAL 1,067 (H) 09/05/2023     Lab Results   Component Value Date    WBC 10.12 09/06/2023    HGB 13.7 09/06/2023    HCT 41.0 09/06/2023     (L) 09/06/2023     Lab Results   Component Value Date    INR 1.99 04/27/2015     No results found for: \"MG\"  Lab Results   Component Value Date    TSH 3.530 09/04/2023     No results found for: \"BNP\"  Lab Results   Component Value Date    TRIG 217 (H) 09/05/2023     No results found for: \"LDL\"  No results found for: \"PROBNP\"            Procedures       I personally viewed and interpreted the patient's LAB data         Assessment:   No diagnosis found.      Plan:              No follow-ups on file.    "

## 2024-03-01 ENCOUNTER — HOSPITAL ENCOUNTER (OUTPATIENT)
Dept: NUCLEAR MEDICINE | Facility: HOSPITAL | Age: 74
Discharge: HOME | End: 2024-03-01
Payer: MEDICARE

## 2024-03-01 ENCOUNTER — HOSPITAL ENCOUNTER (OUTPATIENT)
Dept: CARDIOLOGY | Facility: HOSPITAL | Age: 74
Discharge: HOME | End: 2024-03-01
Payer: MEDICARE

## 2024-03-01 DIAGNOSIS — I48.0 PAROXYSMAL ATRIAL FIBRILLATION: ICD-10-CM

## 2024-03-01 DIAGNOSIS — R07.9 CHEST PAIN IN ADULT: ICD-10-CM

## 2024-03-01 LAB
BH CV NUCLEAR PRIOR STUDY: 3
BH CV REST NUCLEAR ISOTOPE DOSE: 9.9 MCI
BH CV STRESS BP STAGE 1: NORMAL
BH CV STRESS COMMENTS STAGE 1: NORMAL
BH CV STRESS DOSE REGADENOSON STAGE 1: 0.4
BH CV STRESS DURATION MIN STAGE 1: 0
BH CV STRESS DURATION SEC STAGE 1: 10
BH CV STRESS HR STAGE 1: 85
BH CV STRESS NUCLEAR ISOTOPE DOSE: 30.1 MCI
BH CV STRESS PROTOCOL 1: NORMAL
BH CV STRESS RECOVERY BP: NORMAL MMHG
BH CV STRESS RECOVERY HR: 82 BPM
BH CV STRESS STAGE 1: 1
LV EF NUC BP: 58 %
MAXIMAL PREDICTED HEART RATE: 147 BPM
PERCENT MAX PREDICTED HR: 57.82 %
STRESS BASELINE BP: NORMAL MMHG
STRESS BASELINE HR: 64 BPM
STRESS PERCENT HR: 68 %
STRESS POST PEAK BP: NORMAL MMHG
STRESS POST PEAK HR: 85 BPM
STRESS TARGET HR: 125 BPM

## 2024-03-01 PROCEDURE — 78452 HT MUSCLE IMAGE SPECT MULT: CPT

## 2024-03-01 PROCEDURE — A9500 TC99M SESTAMIBI: HCPCS | Performed by: INTERNAL MEDICINE

## 2024-03-01 PROCEDURE — 93017 CV STRESS TEST TRACING ONLY: CPT

## 2024-03-01 PROCEDURE — 0 TECHNETIUM SESTAMIBI: Performed by: INTERNAL MEDICINE

## 2024-03-01 PROCEDURE — 25010000002 REGADENOSON 0.4 MG/5ML SOLUTION: Performed by: INTERNAL MEDICINE

## 2024-03-01 RX ORDER — REGADENOSON 0.08 MG/ML
0.4 INJECTION, SOLUTION INTRAVENOUS
Status: COMPLETED | OUTPATIENT
Start: 2024-03-01 | End: 2024-03-01

## 2024-03-01 RX ADMIN — REGADENOSON 0.4 MG: 0.08 INJECTION, SOLUTION INTRAVENOUS at 12:24

## 2024-03-01 RX ADMIN — TECHNETIUM TC 99M SESTAMIBI 1 DOSE: 1 INJECTION INTRAVENOUS at 12:24

## 2024-03-01 RX ADMIN — TECHNETIUM TC 99M SESTAMIBI 1 DOSE: 1 INJECTION INTRAVENOUS at 11:03

## 2024-03-05 ENCOUNTER — TELEPHONE (OUTPATIENT)
Dept: CARDIOLOGY | Facility: CLINIC | Age: 74
End: 2024-03-05
Payer: MEDICARE

## 2024-03-05 NOTE — TELEPHONE ENCOUNTER
Called and given message per dr Fernandes.         Birdie Fernandes MD  [facility]  Stress test shows a small scar at the apex of the heart however functions are normal and there is no evidence of new blockages

## 2024-03-05 NOTE — TELEPHONE ENCOUNTER
----- Message from Birdie Fernandes MD sent at 3/4/2024  9:37 AM EST -----  Stress test shows a small scar at the apex of the heart however functions are normal and there is no evidence of new blockages

## 2024-08-01 ENCOUNTER — OFFICE VISIT (OUTPATIENT)
Dept: CARDIOLOGY | Facility: CLINIC | Age: 74
End: 2024-08-01
Payer: MEDICARE

## 2024-08-01 VITALS
SYSTOLIC BLOOD PRESSURE: 142 MMHG | HEART RATE: 62 BPM | BODY MASS INDEX: 33.43 KG/M2 | HEIGHT: 67 IN | WEIGHT: 213 LBS | OXYGEN SATURATION: 99 % | DIASTOLIC BLOOD PRESSURE: 76 MMHG

## 2024-08-01 DIAGNOSIS — I48.0 PAROXYSMAL ATRIAL FIBRILLATION: Primary | ICD-10-CM

## 2024-08-01 DIAGNOSIS — Z79.01 CHRONIC ANTICOAGULATION: ICD-10-CM

## 2024-08-01 DIAGNOSIS — E78.2 MIXED HYPERLIPIDEMIA: ICD-10-CM

## 2024-08-01 DIAGNOSIS — I10 ESSENTIAL HYPERTENSION: ICD-10-CM

## 2024-08-01 RX ORDER — APIXABAN 5 MG/1
5 TABLET, FILM COATED ORAL EVERY 12 HOURS
Qty: 56 TABLET | Refills: 0 | COMMUNITY
Start: 2024-08-01

## 2024-08-01 NOTE — PROGRESS NOTES
subjective     Chief Complaint   Patient presents with    Atrial Fibrillation     Follow up      Hypertension     today       Problems Addressed This Visit  1. Paroxysmal atrial fibrillation    2. Chronic anticoagulation,eliquis    3. Essential hypertension    4. Mixed hyperlipidemia        History of Present Illness    Patient is 73 years old white male who is here for cardiology follow-up.    He was admitted to the hospital on September 4, 2023 with atrial fibrillation rapid ventricular rate.  Apparently he was on vacation and complained of weakness fatigue and confusion.  He had an episode of fall and was unable to get on his feet and then family brought him to the emergency room     He was found to have atrial fibrillation with rapid ventricular rate  His sodium was very low around 119 and required normal saline and hydrochlorothiazide was held.  Patient was also given salt tabs  Troponin was elevated and patient was classified as non-STEMI type II.  Electrolytes are back to normal.  He had a stress test done on March 1, 2024.  No significant exercise-induced myocardial ischemia was demonstrated, LV ejection fraction was 58%.    Patient is asymptomatic denies any chest pain palpitations or shortness of breath.    Paroxysmal atrial fibrillation  Maintaining sinus rhythm with a heart rate of 62 bpm.  He is anticoagulated with Eliquis 5 twice daily.  It is also taking metoprolol but he is not sure how much is he taking    Hospital records indicate that he will be taking metoprolol  mg daily however he says that he is taking twice a day but does not know the dose.    Hypertension is very well-controlled his blood pressure at home is around 130 systolic.    Hyperlipidemia on Lipitor therapy lab work reviewed lipid control is good.    Patient is overweight diet restriction discussed.      Past Surgical History:   Procedure Laterality Date    CARDIAC ABLATION      CATHETER ATRIAL SUPRAVENTRICULAR TACHYCARDIA     CARDIAC CATHETERIZATION      CARPAL TUNNEL RELEASE      COLECTOMY PARTIAL / TOTAL      HERNIA REPAIR      KNEE ARTHROSCOPY      BOTH KNEES     Family History   Problem Relation Age of Onset    Cancer Mother     Diabetes Father     Heart disease Father     Heart attack Father     Diabetes Other     Heart disease Other      Past Medical History:   Diagnosis Date    Abnormal ECG     Arrhythmia     Atrial fibrillation     Bowel trouble     Diabetes mellitus     Hyperlipidemia     Hypertension     Hypothyroid     Rheumatoid aortitis      Patient Active Problem List   Diagnosis    Paroxysmal atrial fibrillation    Chronic anticoagulation,eliquis    Mixed hyperlipidemia    Hypothyroidism (acquired)    Essential hypertension    Chest pain in adult    Ventricular tachycardia, non-sustained    Hyponatremia       Social History     Tobacco Use    Smoking status: Former     Current packs/day: 0.00     Average packs/day: 0.5 packs/day for 3.0 years (1.5 ttl pk-yrs)     Types: Cigarettes     Start date: 1975     Quit date: 1978     Years since quittin.6    Smokeless tobacco: Never   Vaping Use    Vaping status: Never Used   Substance Use Topics    Alcohol use: Not Currently     Comment: occasional    Drug use: Never       No Known Allergies    Current Outpatient Medications on File Prior to Visit   Medication Sig    aspirin 81 MG tablet Take 1 tablet by mouth Daily.    atorvastatin (LIPITOR) 20 MG tablet Take 1 tablet by mouth Every Night.    COENZYME Q-10 PO Take 1 capsule by mouth Daily.    levothyroxine (SYNTHROID, LEVOTHROID) 100 MCG tablet Take 125 mcg by mouth Daily.    metFORMIN ER (GLUCOPHAGE-XR) 500 MG 24 hr tablet Take 2 tablets by mouth Every Night.    metoprolol succinate XL (TOPROL-XL) 100 MG 24 hr tablet Take 1 tablet by mouth Daily.    pantoprazole (PROTONIX) 40 MG EC tablet Take 1 tablet by mouth Daily.    sertraline (ZOLOFT) 100 MG tablet Take 1 tablet by mouth Daily.    sulfaSALAzine  (AZULFIDINE) 500 MG tablet Take 1 tablet by mouth 2 (Two) Times a Day.    zolpidem (AMBIEN) 10 MG tablet Take 1 tablet by mouth Every Night.    [DISCONTINUED] Eliquis 5 MG tablet tablet Take 1 tablet by mouth Every 12 (Twelve) Hours.    metoprolol tartrate 75 MG tablet Take 75 mg by mouth Every 12 (Twelve) Hours for 30 days.     No current facility-administered medications on file prior to visit.     (Not in a hospital admission)      Results for orders placed during the hospital encounter of 09/04/23    Adult Transthoracic Echo Complete W/ Cont if Necessary Per Protocol    Interpretation Summary    Normal left ventricular cavity size and wall thickness noted. All left ventricular wall segments contract normally.    Left ventricular ejection fraction appears to be 61 - 65%.    The right atrial cavity is mild to moderately  dilated.    Estimated right ventricular systolic pressure from tricuspid regurgitation is normal (<35 mmHg).    The aortic valve exhibits sclerosis. The aortic valve appears trileaflet. Trace aortic valve regurgitation is present. No aortic valve stenosis is present.    The mitral valve is structurally normal with no significant stenosis present. Mild mitral valve regurgitation is present.    There is no evidence of pericardial effusion. .    Results for orders placed during the hospital encounter of 03/01/24    Stress Test With Myocardial Perfusion One Day    Interpretation Summary    A pharmacological stress test was performed using regadenoson without low-level exercise.    Resting EKG showed sinus rhythm at a rate of 64 bpm.  Nonspecific ST changes were noted.    ST segments did not show any diagnostic changes.  No significant arrhythmia detected.    A small fixed inferoapical defect was noted wall motion in this area was normal.  No reversible perfusion defect was noted.    Myocardial perfusion imaging indicates a normal myocardial perfusion study with no evidence of ischemia. Impressions are  "consistent with a low risk study.    Left ventricular ejection fraction is normal (Calculated EF = 58%).    Compared to the prior study from 2/2/2022 the current study reveals no changes.          The following portions of the patient's history were reviewed and updated as appropriate: allergies, current medications, past family history, past medical history, past social history, past surgical history and problem list.    Review of Systems   Constitutional: Negative.   HENT: Negative.  Negative for congestion.    Eyes: Negative.    Cardiovascular: Negative.  Negative for chest pain, cyanosis, dyspnea on exertion, irregular heartbeat, leg swelling, near-syncope, orthopnea, palpitations, paroxysmal nocturnal dyspnea and syncope.   Respiratory: Negative.  Negative for shortness of breath.    Hematologic/Lymphatic: Negative.    Musculoskeletal: Negative.    Gastrointestinal: Negative.    Neurological: Negative.  Negative for headaches.          Objective:     /76 (BP Location: Left arm, Patient Position: Sitting, Cuff Size: Adult)   Pulse 62   Ht 170.2 cm (67\")   Wt 96.6 kg (213 lb)   SpO2 99%   BMI 33.36 kg/m²   Pulmonary:      Effort: Pulmonary effort is normal.      Breath sounds: Normal breath sounds. No stridor. No wheezing. No rhonchi. No rales.   Cardiovascular:      PMI at left midclavicular line. Normal rate. Regular rhythm. Normal S1. Normal S2.       Murmurs: There is no murmur.      No gallop.  No click. No rub.   Pulses:     Intact distal pulses.   Edema:     Peripheral edema absent.           Lab Review          Procedures       I personally viewed and interpreted the patient's LAB data         Assessment:     1. Paroxysmal atrial fibrillation    2. Chronic anticoagulation,eliquis    3. Essential hypertension    4. Mixed hyperlipidemia          Plan:     Patient is 73 years old white male who is here for cardiology follow-up.  Patient has paroxysmal atrial fibrillation is maintaining sinus " rhythm and is taking Eliquis 5 twice daily.  He is totally asymptomatic.  Heart rate is running around 62 bpm  Patient is taking metoprolol but is not sure about the dose.    Stress test was negative for significant exercise-induced myocardial ischemia.    Blood pressure is normal without Hyzaar which was discontinued because of hyponatremia 5 months ago.    Lab work reviewed electrolytes are normal  Lipid panel and thyroid functions are also normal.    Patient is doing very well from cardiac standpoint  I do not believe that he needs to continue aspirin at this time but he will need to continue Eliquis 5 twice daily.    Follow-up in 6 months.  With EKG at that time.        No follow-ups on file.

## 2024-08-01 NOTE — LETTER
August 1, 2024     Caroline Fernando MD  [address] [place] [address]    Patient: Clinton Hernandez   YOB: 1950   Date of Visit: 8/1/2024       Dear Caroline Fernando MD    Clinton Hernandez was in my office today. Below is a copy of my note.    If you have questions, please do not hesitate to call me. I look forward to following Clinton along with you.         Sincerely,        Birdie Fernandes MD        CC: No Recipients    subjective     Chief Complaint   Patient presents with   • Atrial Fibrillation     Follow up     • Hypertension     today       Problems Addressed This Visit  1. Chronic anticoagulation,eliquis    2. Essential hypertension    3. Mixed hyperlipidemia    4. Paroxysmal atrial fibrillation        History of Present Illness    Patient is 73 years old white male who is here for cardiology follow-up.  Recently he was admitted to the hospital but is found to have hyponatremia.  Hyzaar was discontinued.  Electrolytes are normal at this time.    Patient is asymptomatic denies any chest pain palpitations or shortness of breath.  Paroxysmal atrial fibrillation  Maintaining sinus rhythm with a heart rate of 62 bpm.  He is anticoagulated with Eliquis 5 twice daily.  It is also taking metoprolol but he is not sure how much is he taking  Hospital records indicate that he will be taking metoprolol  mg daily however he says that he is taking twice a day but does not know the dose.    Hypertension is very well-controlled his blood pressure at home is around 130 systolic.    Hyperlipidemia on Lipitor therapy lab work reviewed lipid control is good.    Patient is overweight diet restriction discussed.      Past Surgical History:   Procedure Laterality Date   • CARDIAC ABLATION      CATHETER ATRIAL SUPRAVENTRICULAR TACHYCARDIA   • CARDIAC CATHETERIZATION  1990   • CARPAL TUNNEL RELEASE     • COLECTOMY PARTIAL / TOTAL     • HERNIA REPAIR     • KNEE ARTHROSCOPY      BOTH KNEES     Family History    Problem Relation Age of Onset   • Cancer Mother    • Diabetes Father    • Heart disease Father    • Heart attack Father    • Diabetes Other    • Heart disease Other      Past Medical History:   Diagnosis Date   • Abnormal ECG    • Arrhythmia    • Atrial fibrillation    • Bowel trouble    • Diabetes mellitus    • Hyperlipidemia    • Hypertension    • Hypothyroid    • Rheumatoid aortitis      Patient Active Problem List   Diagnosis   • Paroxysmal atrial fibrillation   • Chronic anticoagulation,eliquis   • Mixed hyperlipidemia   • Hypothyroidism (acquired)   • Essential hypertension   • Chest pain in adult   • Ventricular tachycardia, non-sustained   • Hyponatremia       Social History     Tobacco Use   • Smoking status: Former     Current packs/day: 0.00     Average packs/day: 0.5 packs/day for 3.0 years (1.5 ttl pk-yrs)     Types: Cigarettes     Start date: 1975     Quit date: 1978     Years since quittin.6   • Smokeless tobacco: Never   Vaping Use   • Vaping status: Never Used   Substance Use Topics   • Alcohol use: Not Currently     Comment: occasional   • Drug use: Never       No Known Allergies    Current Outpatient Medications on File Prior to Visit   Medication Sig   • aspirin 81 MG tablet Take 1 tablet by mouth Daily.   • atorvastatin (LIPITOR) 20 MG tablet Take 1 tablet by mouth Every Night.   • COENZYME Q-10 PO Take 1 capsule by mouth Daily.   • levothyroxine (SYNTHROID, LEVOTHROID) 100 MCG tablet Take 125 mcg by mouth Daily.   • metFORMIN ER (GLUCOPHAGE-XR) 500 MG 24 hr tablet Take 2 tablets by mouth Every Night.   • metoprolol succinate XL (TOPROL-XL) 100 MG 24 hr tablet Take 1 tablet by mouth Daily.   • pantoprazole (PROTONIX) 40 MG EC tablet Take 1 tablet by mouth Daily.   • sertraline (ZOLOFT) 100 MG tablet Take 1 tablet by mouth Daily.   • sulfaSALAzine (AZULFIDINE) 500 MG tablet Take 1 tablet by mouth 2 (Two) Times a Day.   • zolpidem (AMBIEN) 10 MG tablet Take 1 tablet by mouth  Every Night.   • [DISCONTINUED] Eliquis 5 MG tablet tablet Take 1 tablet by mouth Every 12 (Twelve) Hours.   • metoprolol tartrate 75 MG tablet Take 75 mg by mouth Every 12 (Twelve) Hours for 30 days.     No current facility-administered medications on file prior to visit.     (Not in a hospital admission)      Results for orders placed during the hospital encounter of 09/04/23    Adult Transthoracic Echo Complete W/ Cont if Necessary Per Protocol    Interpretation Summary  •  Normal left ventricular cavity size and wall thickness noted. All left ventricular wall segments contract normally.  •  Left ventricular ejection fraction appears to be 61 - 65%.  •  The right atrial cavity is mild to moderately  dilated.  •  Estimated right ventricular systolic pressure from tricuspid regurgitation is normal (<35 mmHg).  •  The aortic valve exhibits sclerosis. The aortic valve appears trileaflet. Trace aortic valve regurgitation is present. No aortic valve stenosis is present.  •  The mitral valve is structurally normal with no significant stenosis present. Mild mitral valve regurgitation is present.  •  There is no evidence of pericardial effusion. .    Results for orders placed during the hospital encounter of 03/01/24    Stress Test With Myocardial Perfusion One Day    Interpretation Summary  •  A pharmacological stress test was performed using regadenoson without low-level exercise.  •  Resting EKG showed sinus rhythm at a rate of 64 bpm.  Nonspecific ST changes were noted.  •  ST segments did not show any diagnostic changes.  No significant arrhythmia detected.  •  A small fixed inferoapical defect was noted wall motion in this area was normal.  No reversible perfusion defect was noted.  •  Myocardial perfusion imaging indicates a normal myocardial perfusion study with no evidence of ischemia. Impressions are consistent with a low risk study.  •  Left ventricular ejection fraction is normal (Calculated EF = 58%).  •   "Compared to the prior study from 2/2/2022 the current study reveals no changes.          The following portions of the patient's history were reviewed and updated as appropriate: allergies, current medications, past family history, past medical history, past social history, past surgical history and problem list.    ROS       Objective:     /76 (BP Location: Left arm, Patient Position: Sitting, Cuff Size: Adult)   Pulse 62   Ht 170.2 cm (67\")   Wt 96.6 kg (213 lb)   SpO2 99%   BMI 33.36 kg/m²   Physical Exam      Lab Review      Procedures       I personally viewed and interpreted the patient's LAB data         Assessment:     1. Chronic anticoagulation,eliquis    2. Essential hypertension    3. Mixed hyperlipidemia    4. Paroxysmal atrial fibrillation          Plan:              No follow-ups on file.    "

## 2025-02-04 ENCOUNTER — OFFICE VISIT (OUTPATIENT)
Dept: CARDIOLOGY | Facility: CLINIC | Age: 75
End: 2025-02-04
Payer: MEDICARE

## 2025-02-04 VITALS
OXYGEN SATURATION: 97 % | WEIGHT: 214.8 LBS | HEIGHT: 67 IN | SYSTOLIC BLOOD PRESSURE: 140 MMHG | HEART RATE: 67 BPM | DIASTOLIC BLOOD PRESSURE: 72 MMHG | RESPIRATION RATE: 16 BRPM | BODY MASS INDEX: 33.71 KG/M2

## 2025-02-04 DIAGNOSIS — I10 ESSENTIAL HYPERTENSION: ICD-10-CM

## 2025-02-04 DIAGNOSIS — I48.0 PAROXYSMAL ATRIAL FIBRILLATION: Primary | ICD-10-CM

## 2025-02-04 DIAGNOSIS — Z79.01 CHRONIC ANTICOAGULATION: ICD-10-CM

## 2025-02-04 DIAGNOSIS — E78.2 MIXED HYPERLIPIDEMIA: ICD-10-CM

## 2025-02-04 RX ORDER — APIXABAN 5 MG/1
5 TABLET, FILM COATED ORAL EVERY 12 HOURS SCHEDULED
Qty: 56 TABLET | Refills: 0 | COMMUNITY
Start: 2025-02-04

## 2025-02-04 RX ORDER — AMLODIPINE BESYLATE 10 MG/1
10 TABLET ORAL
COMMUNITY
Start: 2025-01-11

## 2025-02-04 RX ORDER — LOSARTAN POTASSIUM 100 MG/1
1 TABLET ORAL DAILY
COMMUNITY
Start: 2024-11-13

## 2025-02-04 NOTE — LETTER
2025     Caroline Fernando MD  [address] [place] [address]    Patient: Clinton Hernandez   YOB: 1950   Date of Visit: 2025       Dear Caroline Fernando MD    Clinton Hernandez was in my office today. Below is a copy of my note.    If you have questions, please do not hesitate to call me. I look forward to following Clinton along with you.         Sincerely,        Birdie Fernandes MD        CC: No Recipients    subjective     Chief Complaint   Patient presents with   • Follow-up   • Med Management       Problems Addressed This Visit  No diagnosis found.    History of Present Illness          Past Surgical History:   Procedure Laterality Date   • CARDIAC ABLATION      CATHETER ATRIAL SUPRAVENTRICULAR TACHYCARDIA   • CARDIAC CATHETERIZATION     • CARPAL TUNNEL RELEASE     • COLECTOMY PARTIAL / TOTAL     • HERNIA REPAIR     • KNEE ARTHROSCOPY      BOTH KNEES     Family History   Problem Relation Age of Onset   • Cancer Mother    • Diabetes Father    • Heart disease Father    • Heart attack Father    • Diabetes Other    • Heart disease Other      Past Medical History:   Diagnosis Date   • Abnormal ECG    • Arrhythmia    • Atrial fibrillation    • Bowel trouble    • Diabetes mellitus    • Hyperlipidemia    • Hypertension    • Hypothyroid    • Rheumatoid aortitis      Patient Active Problem List   Diagnosis   • Paroxysmal atrial fibrillation   • Chronic anticoagulation,eliquis   • Mixed hyperlipidemia   • Hypothyroidism (acquired)   • Essential hypertension   • Chest pain in adult   • Ventricular tachycardia, non-sustained   • Hyponatremia       Social History     Tobacco Use   • Smoking status: Former     Current packs/day: 0.00     Average packs/day: 0.5 packs/day for 3.0 years (1.5 ttl pk-yrs)     Types: Cigarettes     Start date: 1975     Quit date: 1978     Years since quittin.1   • Smokeless tobacco: Never   Vaping Use   • Vaping status: Never Used   Substance  Use Topics   • Alcohol use: Not Currently     Comment: occasional   • Drug use: Never       No Known Allergies    Current Outpatient Medications on File Prior to Visit   Medication Sig   • amLODIPine (NORVASC) 10 MG tablet Take 1 tablet by mouth every night at bedtime.   • aspirin 81 MG tablet Take 1 tablet by mouth Daily.   • atorvastatin (LIPITOR) 40 MG tablet Take 1 tablet by mouth Every Night.   • COENZYME Q-10 PO Take 1 capsule by mouth Daily.   • Eliquis 5 MG tablet tablet Take 1 tablet by mouth Every 12 (Twelve) Hours.   • levothyroxine (SYNTHROID, LEVOTHROID) 150 MCG tablet Take 1 tablet by mouth Daily.   • losartan (COZAAR) 100 MG tablet Take 1 tablet by mouth Daily.   • metFORMIN ER (GLUCOPHAGE-XR) 500 MG 24 hr tablet Take 2 tablets by mouth Every Night.   • pantoprazole (PROTONIX) 40 MG EC tablet Take 1 tablet by mouth Daily.   • sertraline (ZOLOFT) 100 MG tablet Take 1 tablet by mouth Daily.   • sulfaSALAzine (AZULFIDINE) 500 MG tablet Take 1 tablet by mouth 2 (Two) Times a Day.   • zolpidem (AMBIEN) 10 MG tablet Take 1 tablet by mouth Every Night.   • metoprolol tartrate 75 MG tablet Take 75 mg by mouth Every 12 (Twelve) Hours for 30 days. (Patient taking differently: Take 100 mg by mouth Every 12 (Twelve) Hours.)   • [DISCONTINUED] metoprolol succinate XL (TOPROL-XL) 100 MG 24 hr tablet Take 1 tablet by mouth Daily.     No current facility-administered medications on file prior to visit.     (Not in a hospital admission)      Results for orders placed during the hospital encounter of 09/04/23    Adult Transthoracic Echo Complete W/ Cont if Necessary Per Protocol    Interpretation Summary  •  Normal left ventricular cavity size and wall thickness noted. All left ventricular wall segments contract normally.  •  Left ventricular ejection fraction appears to be 61 - 65%.  •  The right atrial cavity is mild to moderately  dilated.  •  Estimated right ventricular systolic pressure from tricuspid  "regurgitation is normal (<35 mmHg).  •  The aortic valve exhibits sclerosis. The aortic valve appears trileaflet. Trace aortic valve regurgitation is present. No aortic valve stenosis is present.  •  The mitral valve is structurally normal with no significant stenosis present. Mild mitral valve regurgitation is present.  •  There is no evidence of pericardial effusion. .    Results for orders placed during the hospital encounter of 03/01/24    Stress Test With Myocardial Perfusion One Day    Interpretation Summary  •  A pharmacological stress test was performed using regadenoson without low-level exercise.  •  Resting EKG showed sinus rhythm at a rate of 64 bpm.  Nonspecific ST changes were noted.  •  ST segments did not show any diagnostic changes.  No significant arrhythmia detected.  •  A small fixed inferoapical defect was noted wall motion in this area was normal.  No reversible perfusion defect was noted.  •  Myocardial perfusion imaging indicates a normal myocardial perfusion study with no evidence of ischemia. Impressions are consistent with a low risk study.  •  Left ventricular ejection fraction is normal (Calculated EF = 58%).  •  Compared to the prior study from 2/2/2022 the current study reveals no changes.          The following portions of the patient's history were reviewed and updated as appropriate: allergies, current medications, past family history, past medical history, past social history, past surgical history and problem list.    ROS       Objective:     /72 (BP Location: Left arm, Patient Position: Sitting, Cuff Size: Adult)   Pulse 67   Resp 16   Ht 170.2 cm (67\")   Wt 97.4 kg (214 lb 12.8 oz)   SpO2 97%   BMI 33.64 kg/m²   Physical Exam      Lab Review  Lab Results   Component Value Date     (L) 09/07/2023    K 4.0 09/07/2023     09/07/2023    BUN 15 09/07/2023    CREATININE 0.76 09/07/2023    GLUCOSE 122 (H) 09/07/2023    CALCIUM 7.1 (L) 09/07/2023    ALT 73 (H) " "09/06/2023    ALKPHOS 45 09/06/2023    LABIL2 1.4 (L) 04/15/2015     Lab Results   Component Value Date    CKTOTAL 1,067 (H) 09/05/2023     Lab Results   Component Value Date    WBC 10.12 09/06/2023    HGB 13.7 09/06/2023    HCT 41.0 09/06/2023     (L) 09/06/2023     Lab Results   Component Value Date    INR 1.99 04/27/2015     No results found for: \"MG\"  Lab Results   Component Value Date    TSH 3.530 09/04/2023     No results found for: \"BNP\"  Lab Results   Component Value Date    TRIG 217 (H) 09/05/2023     No results found for: \"LDL\"  No results found for: \"PROBNP\"            Procedures       I personally viewed and interpreted the patient's LAB data         Assessment:   No diagnosis found.      Plan:              No follow-ups on file.     "

## 2025-02-04 NOTE — PROGRESS NOTES
subjective     Chief Complaint   Patient presents with    Follow-up    Med Management       Problems Addressed This Visit  1. Paroxysmal atrial fibrillation    2. Essential hypertension    3. Mixed hyperlipidemia    4. Chronic anticoagulation,eliquis        History of Present Illness    Patient is 74 years old white male who is here for follow-up.  He has been doing very well blood pressure has been running slightly high and PCP had added Norvasc 10 mg daily.  Blood pressure is around 140/80 today  He denies any chest pain palpitations or shortness of breath.    History of paroxysmal atrial fibrillation currently maintaining sinus rhythm.  Heart rate is very well-controlled with metoprolol.  Anticoagulated with Eliquis 5 twice daily.    Hyperlipidemia patient is trying to lose weight and diet and is taking statin therapy with Lipitor.  He is also diabetic and has hypothyroidism.      Past Surgical History:   Procedure Laterality Date    CARDIAC ABLATION      CATHETER ATRIAL SUPRAVENTRICULAR TACHYCARDIA    CARDIAC CATHETERIZATION  1990    CARPAL TUNNEL RELEASE      COLECTOMY PARTIAL / TOTAL      HERNIA REPAIR      KNEE ARTHROSCOPY      BOTH KNEES     Family History   Problem Relation Age of Onset    Cancer Mother     Diabetes Father     Heart disease Father     Heart attack Father     Diabetes Other     Heart disease Other      Past Medical History:   Diagnosis Date    Abnormal ECG 2021    Arrhythmia 2022    Atrial fibrillation     Bowel trouble     Diabetes mellitus     Hyperlipidemia     Hypertension     Hypothyroid     Rheumatoid aortitis      Patient Active Problem List   Diagnosis    Paroxysmal atrial fibrillation    Chronic anticoagulation,eliquis    Mixed hyperlipidemia    Hypothyroidism (acquired)    Essential hypertension    Chest pain in adult    Ventricular tachycardia, non-sustained    Hyponatremia       Social History     Tobacco Use    Smoking status: Former     Current packs/day: 0.00     Average  packs/day: 0.5 packs/day for 3.0 years (1.5 ttl pk-yrs)     Types: Cigarettes     Start date: 1975     Quit date: 1978     Years since quittin.1    Smokeless tobacco: Never   Vaping Use    Vaping status: Never Used   Substance Use Topics    Alcohol use: Not Currently     Comment: occasional    Drug use: Never       No Known Allergies    Current Outpatient Medications on File Prior to Visit   Medication Sig    amLODIPine (NORVASC) 10 MG tablet Take 1 tablet by mouth every night at bedtime.    atorvastatin (LIPITOR) 40 MG tablet Take 1 tablet by mouth Every Night.    COENZYME Q-10 PO Take 1 capsule by mouth Daily.    levothyroxine (SYNTHROID, LEVOTHROID) 150 MCG tablet Take 1 tablet by mouth Daily.    losartan (COZAAR) 100 MG tablet Take 1 tablet by mouth Daily.    metFORMIN ER (GLUCOPHAGE-XR) 500 MG 24 hr tablet Take 2 tablets by mouth Every Night.    pantoprazole (PROTONIX) 40 MG EC tablet Take 1 tablet by mouth Daily.    sertraline (ZOLOFT) 100 MG tablet Take 1 tablet by mouth Daily.    sulfaSALAzine (AZULFIDINE) 500 MG tablet Take 1 tablet by mouth 2 (Two) Times a Day.    zolpidem (AMBIEN) 10 MG tablet Take 1 tablet by mouth Every Night.    [DISCONTINUED] aspirin 81 MG tablet Take 1 tablet by mouth Daily.    [DISCONTINUED] Eliquis 5 MG tablet tablet Take 1 tablet by mouth Every 12 (Twelve) Hours.    metoprolol tartrate 75 MG tablet Take 75 mg by mouth Every 12 (Twelve) Hours for 30 days. (Patient taking differently: Take 100 mg by mouth Every 12 (Twelve) Hours.)    [DISCONTINUED] metoprolol succinate XL (TOPROL-XL) 100 MG 24 hr tablet Take 1 tablet by mouth Daily.     No current facility-administered medications on file prior to visit.     (Not in a hospital admission)    Results for orders placed during the hospital encounter of 23    Adult Transthoracic Echo Complete W/ Cont if Necessary Per Protocol    Interpretation Summary    Normal left ventricular cavity size and wall thickness noted. All  left ventricular wall segments contract normally.    Left ventricular ejection fraction appears to be 61 - 65%.    The right atrial cavity is mild to moderately  dilated.    Estimated right ventricular systolic pressure from tricuspid regurgitation is normal (<35 mmHg).    The aortic valve exhibits sclerosis. The aortic valve appears trileaflet. Trace aortic valve regurgitation is present. No aortic valve stenosis is present.    The mitral valve is structurally normal with no significant stenosis present. Mild mitral valve regurgitation is present.    There is no evidence of pericardial effusion. .      Results for orders placed during the hospital encounter of 03/01/24    Stress Test With Myocardial Perfusion One Day    Interpretation Summary    A pharmacological stress test was performed using regadenoson without low-level exercise.    Resting EKG showed sinus rhythm at a rate of 64 bpm.  Nonspecific ST changes were noted.    ST segments did not show any diagnostic changes.  No significant arrhythmia detected.    A small fixed inferoapical defect was noted wall motion in this area was normal.  No reversible perfusion defect was noted.    Myocardial perfusion imaging indicates a normal myocardial perfusion study with no evidence of ischemia. Impressions are consistent with a low risk study.    Left ventricular ejection fraction is normal (Calculated EF = 58%).    Compared to the prior study from 2/2/2022 the current study reveals no changes.      The following portions of the patient's history were reviewed and updated as appropriate: allergies, current medications, past family history, past medical history, past social history, past surgical history and problem list.    Review of Systems   Constitutional: Negative.   HENT: Negative.  Negative for congestion.    Eyes: Negative.    Cardiovascular: Negative.  Negative for chest pain, cyanosis, dyspnea on exertion, irregular heartbeat, leg swelling, near-syncope,  "orthopnea, palpitations, paroxysmal nocturnal dyspnea and syncope.   Respiratory: Negative.  Negative for shortness of breath.    Hematologic/Lymphatic: Negative.    Musculoskeletal: Negative.    Gastrointestinal: Negative.    Neurological: Negative.  Negative for headaches.          Objective:     /72 (BP Location: Left arm, Patient Position: Sitting, Cuff Size: Adult)   Pulse 67   Resp 16   Ht 170.2 cm (67\")   Wt 97.4 kg (214 lb 12.8 oz)   SpO2 97%   BMI 33.64 kg/m²   Pulmonary:      Effort: Pulmonary effort is normal.      Breath sounds: Normal breath sounds. No stridor. No wheezing. No rhonchi. No rales.   Cardiovascular:      PMI at left midclavicular line. Normal rate. Regular rhythm. Normal S1. Normal S2.       Murmurs: There is no murmur.      No gallop.  No click. No rub.   Pulses:     Intact distal pulses.   Edema:     Peripheral edema absent.           Lab Review                    ECG 12 Lead    Date/Time: 2/4/2025 4:35 PM  Performed by: Birdie Fernandes MD    Authorized by: Birdie Fernandes MD  Comparison: compared with previous ECG from 2/1/2024  Similar to previous ECG  Rhythm: sinus rhythm  Rate: normal  BPM: 64  Conduction: conduction normal  QRS axis: normal  Other findings: non-specific ST-T wave changes    Clinical impression: normal ECG             I personally viewed and interpreted the patient's LAB data         Assessment:     1. Paroxysmal atrial fibrillation    2. Essential hypertension    3. Mixed hyperlipidemia    4. Chronic anticoagulation,eliquis          Plan:     Paroxysmal atrial fibrillation with [id number]  Patient was advised to continue Eliquis 5 mg twice daily.  Samples were given.  Heart rate is very well-controlled with metoprolol 100 mg twice daily which was continued.    Hypertension  Blood pressure is mildly elevated.  Patient states that he has recently started Norvasc 10 mg daily.  No change in therapy was made he will continue metoprolol " Norvasc and losartan.  Salt restriction weight loss and healthy lifestyle emphasized.    Hyperlipidemia  Patient will continue Lipitor 40 mg daily along with exercise diet restriction and weight loss.    Chronic anticoagulation with Eliquis continued.    Other medical problems include hypothyroidism and diabetes mellitus he is following closely with Dr. Fernando.      EKG does not show any acute changes.

## 2025-08-06 ENCOUNTER — OFFICE VISIT (OUTPATIENT)
Dept: CARDIOLOGY | Facility: CLINIC | Age: 75
End: 2025-08-06
Payer: MEDICARE

## 2025-08-06 VITALS
HEIGHT: 67 IN | HEART RATE: 66 BPM | SYSTOLIC BLOOD PRESSURE: 116 MMHG | DIASTOLIC BLOOD PRESSURE: 66 MMHG | WEIGHT: 220 LBS | BODY MASS INDEX: 34.53 KG/M2 | OXYGEN SATURATION: 95 %

## 2025-08-06 DIAGNOSIS — I48.0 PAROXYSMAL ATRIAL FIBRILLATION: Primary | ICD-10-CM

## 2025-08-06 DIAGNOSIS — E78.2 MIXED HYPERLIPIDEMIA: ICD-10-CM

## 2025-08-06 DIAGNOSIS — Z79.01 CHRONIC ANTICOAGULATION: ICD-10-CM

## 2025-08-06 DIAGNOSIS — I10 ESSENTIAL HYPERTENSION: ICD-10-CM

## 2025-08-06 RX ORDER — OLMESARTAN MEDOXOMIL 40 MG/1
1 TABLET ORAL DAILY
COMMUNITY
Start: 2025-07-01

## 2025-08-06 RX ORDER — APIXABAN 5 MG/1
5 TABLET, FILM COATED ORAL EVERY 12 HOURS SCHEDULED
Qty: 56 TABLET | Refills: 0 | COMMUNITY
Start: 2025-08-06